# Patient Record
Sex: FEMALE | Race: WHITE | NOT HISPANIC OR LATINO | Employment: FULL TIME | URBAN - METROPOLITAN AREA
[De-identification: names, ages, dates, MRNs, and addresses within clinical notes are randomized per-mention and may not be internally consistent; named-entity substitution may affect disease eponyms.]

---

## 2018-09-20 ENCOUNTER — PATIENT OUTREACH (OUTPATIENT)
Dept: OTHER | Facility: MEDICAL CENTER | Age: 63
End: 2018-09-20

## 2018-09-20 NOTE — LETTER
[facility]   [address]  [place] [address]  Phone: [phone] - Fax: [phone]              Thao Wan  [address]  [facility] [address]     Date: 09/20/18  Medical Record Number: [id number]    Dear Thao,      I am a Cancer Nurse Navigator, a certified oncology nurse. My role is to assess any needs you may have with education, guidance and support. I am available to you and your family from diagnosis through your survivorship.       I am available to address your needs during your journey with the following services:     Care Coordination  I can assist you in facilitating communication between your cancer care treatment team to ensure timely treatment and follow-up.  I can also assist with transition of care back to your primary care provider, or other specialist, as needed.  My goal is to bridge gaps for you throughout the course of your active treatment.       Education Services  Understanding the recommended treatment course by your physician is key. I can provide educational resources personalized to your cancer diagnosis to help you understand your diagnosis and treatment. Please let me know if you would like to receive information about your diagnosis and treatment plan.  I am here to help.     Support Services/Resource Information  [facility] we offer a full scope of support services.  I can assist you with referral information to:  · Cancer Clinical Trials & Research  · Nutrition counseling  · Support groups  · Complementary Therapies such as Healing Touch and Mind-Body Techniques Meditation  · Patient Financial Advocates  ·   · [facility], an [employer] affiliate office, our volunteers can assist you with accessing our [email or web address] library, support services information, head coverings and comfort items  · Community and national resources, included eligibility based jourdan assistance and pharmaceutical access  programs, if you are in need of additional information.     [facility] offers services that include:  · Behavioral Health  · Genetic counseling & testing  · Acupuncture  · Lymphedema prevention/treatment program  · Palliative care services.       Our care team includes a Survivorship Nurse Navigator, who meets with you after your treatment is completed to review your survivorship care plan and treatment summary.      I hope you have an excellent patient experience.  Please feel free to share with me your comments regarding the care you have received- we value your feedback.    Sincerely,      Pilar Capone R.N.  Cancer Nurse Navigator    Office: [phone] / [phone]   Email:  [email or web address]

## 2018-09-20 NOTE — PROGRESS NOTES
Pt returns call.  She states that she has been receiving chemotherapy in [place], but she will be transferring her care here to [place] to complete her Herceptin and to move forward with radiation.  Discussed ONN role.  Pt denies any barriers at this time.  Encouraged pt to call should anything arise.

## 2018-10-01 ENCOUNTER — HOSPITAL ENCOUNTER (OUTPATIENT)
Dept: RADIATION ONCOLOGY | Facility: MEDICAL CENTER | Age: 63
End: 2018-10-31
Attending: RADIOLOGY
Payer: COMMERCIAL

## 2018-10-01 VITALS
SYSTOLIC BLOOD PRESSURE: 113 MMHG | DIASTOLIC BLOOD PRESSURE: 67 MMHG | WEIGHT: 167.2 LBS | HEART RATE: 87 BPM | TEMPERATURE: 98.2 F

## 2018-10-01 DIAGNOSIS — C50.812 MALIGNANT NEOPLASM OF OVERLAPPING SITES OF LEFT BREAST IN FEMALE, ESTROGEN RECEPTOR POSITIVE (HCC): ICD-10-CM

## 2018-10-01 DIAGNOSIS — Z17.0 MALIGNANT NEOPLASM OF OVERLAPPING SITES OF LEFT BREAST IN FEMALE, ESTROGEN RECEPTOR POSITIVE (HCC): ICD-10-CM

## 2018-10-01 PROCEDURE — 99214 OFFICE O/P EST MOD 30 MIN: CPT | Performed by: RADIOLOGY

## 2018-10-01 PROCEDURE — 99205 OFFICE O/P NEW HI 60 MIN: CPT | Performed by: RADIOLOGY

## 2018-10-01 RX ORDER — ONDANSETRON 4 MG/1
4 TABLET, ORALLY DISINTEGRATING ORAL EVERY 6 HOURS PRN
COMMUNITY

## 2018-10-01 RX ORDER — VITS A,C,E/LUTEIN/MINERALS 300MCG-200
1 TABLET ORAL DAILY
COMMUNITY

## 2018-10-01 RX ORDER — PROCHLORPERAZINE MALEATE 10 MG
10 TABLET ORAL EVERY 6 HOURS PRN
COMMUNITY

## 2018-10-01 RX ORDER — PANTOPRAZOLE SODIUM 40 MG/1
40 TABLET, DELAYED RELEASE ORAL DAILY
COMMUNITY

## 2018-10-01 RX ORDER — DEXAMETHASONE 4 MG/1
4 TABLET ORAL 2 TIMES DAILY
COMMUNITY
End: 2019-04-17

## 2018-10-01 RX ORDER — CHLORAL HYDRATE 500 MG
1000 CAPSULE ORAL
COMMUNITY

## 2018-10-01 ASSESSMENT — PAIN SCALES - GENERAL: PAINLEVEL: 7=MODERATE-SEVERE PAIN

## 2018-10-01 NOTE — CONSULTS
RADIATION ONCOLOGY CONSULT    DATE OF SERVICE: 10/1/2018    IDENTIFICATION: A 63 y.o. 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  She is here at the kind request of Dr. Jacobson in [place].      HISTORY OF PRESENT ILLNESS: Patient's history dates back to 2014 when she was diagnosed with a ductal carcinoma in situ. She underwent a mastectomy and reconstruction in 2014 no further therapy was delivered at that time. More recently she noted in March of this year of mass in the upper outer quadrant of the reconstructed breast. On 4/2/2018 she underwent a left breast tissue segmental resection. She was found to have a 2.5 cm tumor it was 1 mm from the lateral margin 6 mm from the superior and medial margins and greater than 1 cm from other margins. There was high-grade DCIS present at the superior margin and there was 1 out of 1 nodes involved with tumor and it was a 1 cm tumor focus. Additional lateral margin showed no residual tumor present. Tumor was found to be grade 3 there was no extracapsular extension. Estrogen receptor was 38% progesterone perceptively 1.1% and HER-2/maricarmen 3+. Because of the close margin C underwent a reexcision on 4/23/2018 and the superior margin in that resection specimen showed no evidence of residual tumor. Tumor was sent for man present and she was extremely high risk of recurrence with no treatment and she is currently undergoing [facility] chemotherapy. She will be receiving her last dose tomorrow. She seen in consultation today about radiation therapy for local control       PAST MEDICAL HISTORY:   Past Medical History:   Diagnosis Date   • Cancer (HCC)     breast cancer left   • GERD (gastroesophageal reflux disease)        PAST SURGICAL HISTORY:  Past Surgical History:   Procedure Laterality Date    • MASTECTOMY      left    • OTHER      lumpectomy breast tissue (left) 18 and reexcision due to positive margin 18   • OTHER         • OTHER      implanted port -right       GYNECOLOGICAL STATUS:  : 1, Para: 1 and Number of Interrupted Pregnancies: 0    HORMONE USE:  Contraceptive hormone use 10 years and Post-menopause use 0 years    CURRENT MEDICATIONS:  Current Outpatient Prescriptions   Medication Sig Dispense Refill   • Multiple Vitamins-Minerals (OCUVITE-LUTEIN) Tab Take 1 tablet by mouth every day.     • multivitamin (THERAGRAN) Tab Take 1 Tab by mouth every day.     • vitamin D (CHOLECALCIFEROL) 1000 UNIT Tab Take 1,000 Units by mouth every day.     • Omega-3 Fatty Acids (FISH OIL) 1000 MG Cap capsule Take 1,000 mg by mouth 3 times a day, with meals.     • ondansetron (ZOFRAN ODT) 4 MG TABLET DISPERSIBLE Take 4 mg by mouth every 6 hours as needed for Nausea.     • Lidocaine-Prilocaine (EMLA EX) by Apply externally route.     • prochlorperazine (COMPAZINE) 10 MG Tab Take 10 mg by mouth every 6 hours as needed.     • pantoprazole (PROTONIX) 40 MG Tablet Delayed Response Take 40 mg by mouth every day.     • dexamethasone (DECADRON) 4 MG Tab Take 4 mg by mouth 2 times a day.     • Naproxen Sodium (ALEVE PO) Take  by mouth.       No current facility-administered medications for this encounter.        ALLERGIES:    Patient has no known allergies.    FAMILY HISTORY:    Family History   Problem Relation Age of Onset   • Allergies Mother    • Arrythmia Mother    • Hyperlipidemia Mother    • Stroke Mother    • Cancer Mother         breast cancer   • Diabetes Brother    • Prostate cancer Brother    [unfilled]        SOCIAL HISTORY:     reports that she has quit smoking. She has never used smokeless tobacco. She reports that she does not drink alcohol or use drugs.  Patient is , lives in [place], [place] and has 1 son. 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     REVIEW OF SYSTEMS: Pertinent positives consist of swelling in the lower extremities since chemo 10 pound weight gain since chemo, fatigue dry mouth loss of taste nosebleeds all since chemo. She has trouble with night vision. Since chemo she's had constipation diarrhea heartburn nausea. She has a history of hemorrhoids and nocturia and incontinence. She has muscle pain joint pain and joint swelling from chemo. She's had some memory loss and intermittent colored sputum.  The rest of the review of systems is negative and has been reviewed by me. It is in the nursing note dated 10/1/2018 in [facility]    PHYSICAL EXAM:    0= Fully active, able to carry on all pre-disease performance without restriction.  Vitals:    10/01/18 1307   BP: 113/67   BP Location: Right arm   Patient Position: Sitting   Pulse: 87   Temp: 36.8 °C (98.2 °F)   Weight: 75.8 kg (167 lb 3.2 oz)          Pain Scale: 0-10  Pain Assessement: 7/10  Pain Location, Orientation and Scale: bilateral LE muscle pain  What makes the pain better: aleve  What makes the pain worse: taxol/neulasta    GENERAL: Well-appearing alert and oriented ×3 in no apparent distress  Breasts: Right breast is normal no palpable masses only mild fibroglandular changes port is placed in the infraclavicular fossa. The left reconstructed breast is smaller than the right and there is evidence of a new scar in the upper outer quadrant this area is also smooth flat there is no evidence of nodularity or mass and there are no masses in either axilla.  HEENT:  Pupils are equal, round, and reactive to light.  Extraocular muscles   are intact. Sclerae nonicteric.  Conjunctivae pink.  Oral cavity, tongue   protrudes midline.   NECK:  Supple without evidence of thyromegaly.  NODES:  No peripheral adenopathy of the neck, supraclavicular fossa or axillae   bilaterally.  LUNGS:  Clear to ascultation   HEART:  Regular rate and rhythm.  No murmur appreciated  ABDOMEN:   Soft. No evidence of hepatosplenomegaly.  Positive bowel sounds.  EXTREMITIES:  Without Edema.  NEUROLOGIC:  Cranial nerves II through XII were intact. Normal stance and gait motor and sensory grossly within normal limits              IMPRESSION:    A 63 y.o. 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 Currently completing [facility] chemotherapy.      RECOMMENDATIONS:   I discussed with the patient and her  I would recommend radiation therapy to decrease the chance of local regional recurrence. I think she is at high risk because she had one no taken out and there could be additional nodes that are microscopically positive.  I've described the details of radiation along with the side effects both acute and chronic, including but not exclusive to fatigue, skin reaction, local soreness, swelling, delayed healing, scarring fibrosis discoloration. They also understand that this could affect the implant causing scarring and retraction. Ample time was allowed for questions, and patient understands.    We have tentatively scheduled for simulation in a couple weeks to get started soon thereafter. We have also made a referral to medical oncology as she would like to get the rest of her treatment here and renal as it is closer.    Thank you for the opportunity to participate in her care.  If any questions or comments, please do not hesitate in calling.    Please note that this dictation was created using voice recognition software. I have made every reasonable attempt to correct obvious errors, but I expect that there are errors of grammar and possibly content that I did not discover before finalizing the note.

## 2018-10-16 ENCOUNTER — OFFICE VISIT (OUTPATIENT)
Dept: HEMATOLOGY ONCOLOGY | Facility: MEDICAL CENTER | Age: 63
End: 2018-10-16
Payer: COMMERCIAL

## 2018-10-16 ENCOUNTER — HOSPITAL ENCOUNTER (OUTPATIENT)
Dept: RADIATION ONCOLOGY | Facility: MEDICAL CENTER | Age: 63
End: 2018-10-16

## 2018-10-16 VITALS
HEART RATE: 83 BPM | TEMPERATURE: 97.9 F | WEIGHT: 163.8 LBS | BODY MASS INDEX: 27.96 KG/M2 | HEIGHT: 64 IN | RESPIRATION RATE: 18 BRPM | DIASTOLIC BLOOD PRESSURE: 63 MMHG | OXYGEN SATURATION: 98 % | SYSTOLIC BLOOD PRESSURE: 133 MMHG

## 2018-10-16 DIAGNOSIS — C50.912 MALIGNANT NEOPLASM OF LEFT BREAST IN FEMALE, ESTROGEN RECEPTOR POSITIVE, UNSPECIFIED SITE OF BREAST (HCC): ICD-10-CM

## 2018-10-16 DIAGNOSIS — Z17.0 MALIGNANT NEOPLASM OF LEFT BREAST IN FEMALE, ESTROGEN RECEPTOR POSITIVE, UNSPECIFIED SITE OF BREAST (HCC): ICD-10-CM

## 2018-10-16 PROBLEM — C50.919 MALIGNANT NEOPLASM OF FEMALE BREAST (HCC): Status: ACTIVE | Noted: 2018-10-16

## 2018-10-16 PROCEDURE — 77290 THER RAD SIMULAJ FIELD CPLX: CPT | Mod: 26 | Performed by: RADIOLOGY

## 2018-10-16 PROCEDURE — 77290 THER RAD SIMULAJ FIELD CPLX: CPT | Performed by: RADIOLOGY

## 2018-10-16 PROCEDURE — 77334 RADIATION TREATMENT AID(S): CPT | Performed by: RADIOLOGY

## 2018-10-16 PROCEDURE — 99205 OFFICE O/P NEW HI 60 MIN: CPT | Performed by: INTERNAL MEDICINE

## 2018-10-16 PROCEDURE — 77470 SPECIAL RADIATION TREATMENT: CPT | Mod: 26 | Performed by: RADIOLOGY

## 2018-10-16 PROCEDURE — 77470 SPECIAL RADIATION TREATMENT: CPT | Performed by: RADIOLOGY

## 2018-10-16 PROCEDURE — 77263 THER RADIOLOGY TX PLNG CPLX: CPT | Performed by: RADIOLOGY

## 2018-10-16 PROCEDURE — 77334 RADIATION TREATMENT AID(S): CPT | Mod: 26 | Performed by: RADIOLOGY

## 2018-10-16 RX ORDER — PROCHLORPERAZINE MALEATE 10 MG
10 TABLET ORAL EVERY 6 HOURS PRN
Status: CANCELLED | OUTPATIENT
Start: 2018-10-30

## 2018-10-16 RX ORDER — ACETAMINOPHEN 325 MG/1
650 TABLET ORAL EVERY 4 HOURS PRN
COMMUNITY

## 2018-10-16 RX ORDER — 0.9 % SODIUM CHLORIDE 0.9 %
VIAL (ML) INJECTION PRN
Status: CANCELLED | OUTPATIENT
Start: 2018-10-30

## 2018-10-16 RX ORDER — 0.9 % SODIUM CHLORIDE 0.9 %
5 VIAL (ML) INJECTION PRN
Status: CANCELLED | OUTPATIENT
Start: 2018-10-30

## 2018-10-16 RX ORDER — ONDANSETRON 2 MG/ML
4 INJECTION INTRAMUSCULAR; INTRAVENOUS
Status: CANCELLED | OUTPATIENT
Start: 2018-10-30

## 2018-10-16 RX ORDER — ONDANSETRON 8 MG/1
8 TABLET, ORALLY DISINTEGRATING ORAL
Status: CANCELLED | OUTPATIENT
Start: 2018-10-30

## 2018-10-16 RX ORDER — SODIUM CHLORIDE 9 MG/ML
INJECTION, SOLUTION INTRAVENOUS CONTINUOUS
Status: CANCELLED | OUTPATIENT
Start: 2018-10-30

## 2018-10-16 ASSESSMENT — PAIN SCALES - GENERAL: PAINLEVEL: 2=MINIMAL-SLIGHT

## 2018-10-16 NOTE — PROGRESS NOTES
Consult Note: Oncology     Date of consultation: 10/16/2018 10:04 AM     Referring provider: Anastasia Tee M.D.     Reason for consultation:  T2N1a ER 38% VT 1.1% and HER-2/maricarmen 3+ upper outer quadrant of left reconstructed breast, prior history of DCIS         History of presenting illness:     Patient is a 64 yo F with a history of DCIS with L mastectomy and reconstruction in 2014 who   - March 2018, abnormal mammogram   - 4/2/2018 she underwent a left breast tissue segmental resection.  -2.5 cm tumor , 1 mm from the lateral margin 6 mm from the superior and medial margins and greater than 1 cm from other margins. There was high-grade DCIS present at the superior margin and there was 1 out of 1 nodes involved with tumor and it was a 1 cm tumor focus. Additional lateral margin showed no residual tumor present. Tumor was found to be grade 3 there was no extracapsular extension. Estrogen receptor was 38% progesterone perceptively 1.1% and HER-2/maricarmen 3+. Because of the close margin C underwent a reexcision on 4/23/2018 and the superior margin in that resection specimen showed no evidence of residual tumor T2N1a ER 38% VT 1.1% and HER-2/maricarmen 3+.  - TCH therapy (docetaxel, carboplatin, trastuzumab) in [place], [place] and completed her 6th cycle on 10/02/18. She will be continuing with Herceptin (trastuzumab) treatment next week here in [place]. . She saw Dr. Tee with plans to start radiation soon.    Patient reported a history of mild bone loss detected by a bone scan about two years ago. She has been in menopause for seven years. Family history is significant for mother diagnosed with breast cancer. Genetic testing has not been performed on patient.     Currently, patient reports tolerating chemotherapy well. She does have some residual bilateral upper leg pain that has been improving since the cessation of her TCH therapy. Fatigue is tolerated well by patient. She does not report any other concerning symptoms.          Past Medical History:  Past Medical History        Past Medical History:   Diagnosis Date   • Cancer (HCC)       breast cancer left   • GERD (gastroesophageal reflux disease)              Past surgical history:  Past Surgical History         Past Surgical History:   Procedure Laterality Date   • MASTECTOMY         left    • OTHER         lumpectomy breast tissue (left) 18 and reexcision due to positive margin 18   • OTHER            • OTHER         implanted port -right            Allergies: Patient has no known allergies.     Medications:  Current Medications          Current Outpatient Prescriptions   Medication Sig Dispense Refill   • acetaminophen (TYLENOL) 325 MG Tab Take 650 mg by mouth every four hours as needed.       • Multiple Vitamins-Minerals (OCUVITE-LUTEIN) Tab Take 1 tablet by mouth every day.       • multivitamin (THERAGRAN) Tab Take 1 Tab by mouth every day.       • vitamin D (CHOLECALCIFEROL) 1000 UNIT Tab Take 1,000 Units by mouth every day.       • Omega-3 Fatty Acids (FISH OIL) 1000 MG Cap capsule Take 1,000 mg by mouth 3 times a day, with meals.       • ondansetron (ZOFRAN ODT) 4 MG TABLET DISPERSIBLE Take 4 mg by mouth every 6 hours as needed for Nausea.       • pantoprazole (PROTONIX) 40 MG Tablet Delayed Response Take 40 mg by mouth every day.       • Naproxen Sodium (ALEVE PO) Take by mouth.       • Lidocaine-Prilocaine (EMLA EX) by Apply externally route.       • prochlorperazine (COMPAZINE) 10 MG Tab Take 10 mg by mouth every 6 hours as needed.       • dexamethasone (DECADRON) 4 MG Tab Take 4 mg by mouth 2 times a day.          No current facility-administered medications for this visit.            Social History:  Social History   Social History            Social History   • Marital status:        Spouse name: N/A   • Number of children: N/A   • Years of education: N/A          Occupational History   • Not on file.             Social History Main Topics   •  "Smoking status: Former Smoker   • Smokeless tobacco: Never Used         Comment: quit 18 years ago   • Alcohol use No   • Drug use: No   • Sexual activity: Not on file           Other Topics Concern   • Not on file          Social History Narrative   • No narrative on file            Family History:  Family History         Family History   Problem Relation Age of Onset   • Allergies Mother     • Arrythmia Mother     • Hyperlipidemia Mother     • Stroke Mother     • Cancer Mother       breast cancer   • Diabetes Brother     • Prostate cancer Brother              Review of Systems:  All other review of systems are negative except what was mentioned above in the HPI.     Constitutional: No fever, chills, weight loss , +fatigue (see HPI).  Respiratory:No new cough, sputum production, shortness of breath and wheezing.   Cardiovascular: No new chest pain, palpitations.   Gastrointestinal: No nausea, vomiting, abdominal pain.  Musculoskeletal: +Bilateral leg pain (see HPI).  Neurological: No numbness or tingling.      Physical Exam:  Vitals: /63 (BP Location: Right arm, Patient Position: Sitting, BP Cuff Size: Large adult)  Pulse 83  Temp 36.6 °C (97.9 °F) (Temporal)  Resp 18  Ht 1.626 m (5' 4\")  Wt 74.3 kg (163 lb 12.8 oz)  SpO2 98%  BMI 28.12 kg/m²     General: Not in acute distress, alert and oriented x 3  Lymph nodes: No palpable cervical, supraclavicular, or axillary lymphadenopathy.  Breast: L reconstructed breast noted, no tenderness, no skin dimpling.. She has a well-healed scar in the left reconstructed breast tissue. No other concerning mass or nodularity  CVS: Regular rate and rhythm, no murmurs noted  RESP: Clear to auscultate bilaterally, no wheezing or crackles.  ABD: Soft, non-tender, non-distended.  Extremity - no significant edema.  CNS-no focal neurological symptoms     Labs:  No results for input(s): RBC, HEMOGLOBIN, HEMATOCRIT, PLATELETCT, PROTHROMBTM, APTT, INR, IRON, FERRITIN, TOTIRONBC " in the last 72 hours.  No results found for: SODIUM, POTASSIUM, CHLORIDE, CO2, GLUCOSE, BUN, CREATININE, BUNCREATRAT, GLOMRATE     Assessment and Plan:    T2N1a ER 38% RI 1.1% and HER-2/maricarmen 3+ upper outer quadrant of left reconstructed breast . previous history of DCIS with L mastectomy and reconstruction in 2014. She had recurrence in the reconstructed breast tissue and is status post segmentectomy/Nelia section with negative margins.  She finished [facility] chemotherapy and we will start her on Herceptin maintenance.  . She has not received any projectile according to my information.  -She had estrogen positive, tomorrow, and we discussed the option of adjuvant endocrine therapy employing an aromatase inhibitor. She is postmenopausal. Peripheral discussed the partial adverse effects. We will see her back in 6 weeks to have further discussion and will start Arimidex after she finished radiation.-  -ECHO recommended (last one performed in June/July 2018) to monitor heart function post-chemotherapy.   -Recommended genetic testing for breast cancer mutations due to patient's previous hx of DCIS and positive family hx.   Complex patient requiring complex decision making. I have extensively reviewed her prior medical records as part of establishing care with me and formulated the plan.     She agreed and verbalized her agreement and understanding with the current plan. I answered all questions and concerns she has at this time.  Thank you for allowing me to participate in her care.     Please note that this dictation was created using voice recognition software. I have made every reasonable attempt to correct obvious errors, but I expect that there are errors of grammar and possibly content that I did not discover before finalizing the note.        SIGNATURES:  Cam Harper

## 2018-10-19 ENCOUNTER — TELEPHONE (OUTPATIENT)
Dept: HEMATOLOGY ONCOLOGY | Facility: MEDICAL CENTER | Age: 63
End: 2018-10-19

## 2018-10-19 DIAGNOSIS — Z17.0 MALIGNANT NEOPLASM OF LEFT BREAST IN FEMALE, ESTROGEN RECEPTOR POSITIVE, UNSPECIFIED SITE OF BREAST (HCC): ICD-10-CM

## 2018-10-19 DIAGNOSIS — C50.912 MALIGNANT NEOPLASM OF LEFT BREAST IN FEMALE, ESTROGEN RECEPTOR POSITIVE, UNSPECIFIED SITE OF BREAST (HCC): ICD-10-CM

## 2018-10-19 NOTE — TELEPHONE ENCOUNTER
Called Dr. Purcell's office to obtain patients oncologist notes, their office is close on Friday's will call back Monday 10/22/2018 to obtain records.

## 2018-10-22 PROCEDURE — 77334 RADIATION TREATMENT AID(S): CPT | Performed by: RADIOLOGY

## 2018-10-22 PROCEDURE — 77300 RADIATION THERAPY DOSE PLAN: CPT | Performed by: RADIOLOGY

## 2018-10-22 PROCEDURE — 77300 RADIATION THERAPY DOSE PLAN: CPT | Mod: 26 | Performed by: RADIOLOGY

## 2018-10-22 PROCEDURE — 77295 3-D RADIOTHERAPY PLAN: CPT | Performed by: RADIOLOGY

## 2018-10-22 PROCEDURE — 77334 RADIATION TREATMENT AID(S): CPT | Mod: 26 | Performed by: RADIOLOGY

## 2018-10-22 PROCEDURE — 77295 3-D RADIOTHERAPY PLAN: CPT | Mod: 26 | Performed by: RADIOLOGY

## 2018-10-23 PROCEDURE — 77280 THER RAD SIMULAJ FIELD SMPL: CPT | Mod: 26 | Performed by: RADIOLOGY

## 2018-10-23 PROCEDURE — 77280 THER RAD SIMULAJ FIELD SMPL: CPT | Performed by: RADIOLOGY

## 2018-10-24 ENCOUNTER — TELEPHONE (OUTPATIENT)
Dept: HEMATOLOGY ONCOLOGY | Facility: MEDICAL CENTER | Age: 63
End: 2018-10-24

## 2018-10-24 PROCEDURE — 77412 RADIATION TX DELIVERY LVL 3: CPT | Performed by: RADIOLOGY

## 2018-10-24 NOTE — TELEPHONE ENCOUNTER
Called and spoke with  at Dr. Purcell's office and was stated all records which they have were sent over to our office. There is no further notes from Dr. Purcell.

## 2018-10-24 NOTE — TELEPHONE ENCOUNTER
Called imaging and spoke with aguila to schedule patients echocardiogram and bone density, she stated their already scheduled for echo 10/29/2018 and dexa scan on 4/15/2019.

## 2018-10-25 ENCOUNTER — HOSPITAL ENCOUNTER (OUTPATIENT)
Dept: RADIATION ONCOLOGY | Facility: MEDICAL CENTER | Age: 63
End: 2018-10-25

## 2018-10-25 PROCEDURE — 77412 RADIATION TX DELIVERY LVL 3: CPT | Performed by: RADIOLOGY

## 2018-10-25 PROCEDURE — 77417 THER RADIOLOGY PORT IMAGE(S): CPT | Performed by: RADIOLOGY

## 2018-10-29 ENCOUNTER — HOSPITAL ENCOUNTER (OUTPATIENT)
Dept: RADIATION ONCOLOGY | Facility: MEDICAL CENTER | Age: 63
End: 2018-10-29

## 2018-10-29 ENCOUNTER — HOSPITAL ENCOUNTER (OUTPATIENT)
Dept: CARDIOLOGY | Facility: MEDICAL CENTER | Age: 63
End: 2018-10-29
Attending: INTERNAL MEDICINE
Payer: COMMERCIAL

## 2018-10-29 DIAGNOSIS — Z17.0 MALIGNANT NEOPLASM OF LEFT BREAST IN FEMALE, ESTROGEN RECEPTOR POSITIVE, UNSPECIFIED SITE OF BREAST (HCC): ICD-10-CM

## 2018-10-29 DIAGNOSIS — C50.912 MALIGNANT NEOPLASM OF LEFT BREAST IN FEMALE, ESTROGEN RECEPTOR POSITIVE, UNSPECIFIED SITE OF BREAST (HCC): ICD-10-CM

## 2018-10-29 LAB
LV EJECT FRACT  99904: 60
LV EJECT FRACT MOD 2C 99903: 59.66
LV EJECT FRACT MOD 4C 99902: 60.93
LV EJECT FRACT MOD BP 99901: 61.24

## 2018-10-29 PROCEDURE — 93308 TTE F-UP OR LMTD: CPT | Mod: 26 | Performed by: INTERNAL MEDICINE

## 2018-10-29 PROCEDURE — 93306 TTE W/DOPPLER COMPLETE: CPT

## 2018-10-29 PROCEDURE — 77336 RADIATION PHYSICS CONSULT: CPT | Performed by: RADIOLOGY

## 2018-10-29 PROCEDURE — 77412 RADIATION TX DELIVERY LVL 3: CPT | Performed by: RADIOLOGY

## 2018-10-29 NOTE — PROGRESS NOTES
"Patient Name: Thao Wan     Protocol: TCH       *Dosing Reference*  Trastuzumab 8 mg/kg IV over 90 days on Day 1 of Cycle 1 (completed in [place], [place]) followed by  Trastuzumab 6 mg/kg IV over 30 minutes on Day 1 beginning with Cycle 2  21-day cycle until DP or UT  NCCN Guidelines for Breast Cancer V.1.2018  Barbie JUARES et al - Onkologie. 2010;33(8-9):425-30    Dx: Breast cancer HER2 positive - recurrent        Allergies:  Patient has no known allergies.       BP (!) 89/52   Pulse 85   Temp 36.4 °C (97.5 °F)   Resp 18   Ht 1.635 m (5' 4.37\")   Wt 74.6 kg (164 lb 7.4 oz)   SpO2 98%   BMI 27.91 kg/m²  Body surface area is 1.84 meters squared.    Labs 10/30/18  ANC~ 1830 Plt = 201k   Hgb = 11.6     SCr = 0.67 mg/dL CrCl ~ 96.5 mL/min (minimum SCr of 0.7)   LFT's = WNLs  TBili = 0.3     Labs 10/29/18   ECHO LVEF 60%     Drug Order   (Drug name, dose, route, IV Fluid & volume, frequency, number of doses) Cycle: C1 - Herceptin maintenance      Previous treatment: s/p 6 cycles of TCH in [place], [place], last tx 10/2/18     Medication = Trastuzumab  Base Dose= 6 mg/kg  Calc Dose: Base Dose x 74.6 kg = 447.6 mg  Final Dose = 450 mg  Route = IV  Fluid & Volume =  mL  Admin Duration = Over 30 minutes          <5% difference, okay to treat with final dose     By my signature below, I confirm this process was performed independently with the BSA and all final chemotherapy dosing calculations congruent. I have reviewed the above chemotherapy order and that my calculation of the final dose and BSA (when applicable) corroborate those calculations of the  pharmacist. Discrepancies of 5% or greater in the written dose have been addressed and documented within the [facility] Progress notes.    Aakash Haque, PharmD    "

## 2018-10-30 ENCOUNTER — HOSPITAL ENCOUNTER (OUTPATIENT)
Dept: RADIATION ONCOLOGY | Facility: MEDICAL CENTER | Age: 63
End: 2018-10-30

## 2018-10-30 ENCOUNTER — HOSPITAL ENCOUNTER (OUTPATIENT)
Facility: MEDICAL CENTER | Age: 63
End: 2018-10-30
Attending: NURSE PRACTITIONER
Payer: COMMERCIAL

## 2018-10-30 ENCOUNTER — NON-PROVIDER VISIT (OUTPATIENT)
Dept: HEMATOLOGY ONCOLOGY | Facility: MEDICAL CENTER | Age: 63
End: 2018-10-30
Payer: COMMERCIAL

## 2018-10-30 ENCOUNTER — OUTPATIENT INFUSION SERVICES (OUTPATIENT)
Dept: ONCOLOGY | Facility: MEDICAL CENTER | Age: 63
End: 2018-10-30
Attending: INTERNAL MEDICINE
Payer: COMMERCIAL

## 2018-10-30 ENCOUNTER — OFFICE VISIT (OUTPATIENT)
Dept: HEMATOLOGY ONCOLOGY | Facility: MEDICAL CENTER | Age: 63
End: 2018-10-30
Payer: COMMERCIAL

## 2018-10-30 VITALS
OXYGEN SATURATION: 97 % | HEART RATE: 93 BPM | SYSTOLIC BLOOD PRESSURE: 93 MMHG | BODY MASS INDEX: 28.19 KG/M2 | DIASTOLIC BLOOD PRESSURE: 65 MMHG | WEIGHT: 165.12 LBS | HEIGHT: 64 IN | RESPIRATION RATE: 18 BRPM | TEMPERATURE: 97.6 F

## 2018-10-30 VITALS
WEIGHT: 165.12 LBS | OXYGEN SATURATION: 97 % | DIASTOLIC BLOOD PRESSURE: 65 MMHG | HEART RATE: 93 BPM | SYSTOLIC BLOOD PRESSURE: 93 MMHG | TEMPERATURE: 97.5 F | HEIGHT: 64 IN | BODY MASS INDEX: 28.19 KG/M2 | RESPIRATION RATE: 18 BRPM

## 2018-10-30 VITALS
RESPIRATION RATE: 18 BRPM | BODY MASS INDEX: 28.08 KG/M2 | OXYGEN SATURATION: 98 % | SYSTOLIC BLOOD PRESSURE: 89 MMHG | TEMPERATURE: 97.5 F | HEART RATE: 85 BPM | DIASTOLIC BLOOD PRESSURE: 52 MMHG | HEIGHT: 64 IN | WEIGHT: 164.46 LBS

## 2018-10-30 DIAGNOSIS — C50.912 MALIGNANT NEOPLASM OF LEFT BREAST IN FEMALE, ESTROGEN RECEPTOR POSITIVE, UNSPECIFIED SITE OF BREAST (HCC): ICD-10-CM

## 2018-10-30 DIAGNOSIS — C50.011 MALIGNANT NEOPLASM OF NIPPLE OF RIGHT BREAST IN FEMALE, UNSPECIFIED ESTROGEN RECEPTOR STATUS (HCC): ICD-10-CM

## 2018-10-30 DIAGNOSIS — Z17.0 MALIGNANT NEOPLASM OF LEFT BREAST IN FEMALE, ESTROGEN RECEPTOR POSITIVE, UNSPECIFIED SITE OF BREAST (HCC): ICD-10-CM

## 2018-10-30 LAB
ALBUMIN SERPL BCP-MCNC: 3.8 G/DL (ref 3.2–4.9)
ALBUMIN/GLOB SERPL: 1.5 G/DL
ALP SERPL-CCNC: 63 U/L (ref 30–99)
ALT SERPL-CCNC: 22 U/L (ref 2–50)
ANION GAP SERPL CALC-SCNC: 8 MMOL/L (ref 0–11.9)
AST SERPL-CCNC: 20 U/L (ref 12–45)
BASOPHILS # BLD AUTO: 0.5 % (ref 0–1.8)
BASOPHILS # BLD: 0.02 K/UL (ref 0–0.12)
BILIRUB SERPL-MCNC: 0.3 MG/DL (ref 0.1–1.5)
BUN SERPL-MCNC: 10 MG/DL (ref 8–22)
CALCIUM SERPL-MCNC: 9.3 MG/DL (ref 8.5–10.5)
CHLORIDE SERPL-SCNC: 107 MMOL/L (ref 96–112)
CO2 SERPL-SCNC: 24 MMOL/L (ref 20–33)
CREAT SERPL-MCNC: 0.67 MG/DL (ref 0.5–1.4)
EOSINOPHIL # BLD AUTO: 0.13 K/UL (ref 0–0.51)
EOSINOPHIL NFR BLD: 3.2 % (ref 0–6.9)
ERYTHROCYTE [DISTWIDTH] IN BLOOD BY AUTOMATED COUNT: 61.1 FL (ref 35.9–50)
GLOBULIN SER CALC-MCNC: 2.6 G/DL (ref 1.9–3.5)
GLUCOSE SERPL-MCNC: 107 MG/DL (ref 65–99)
HCT VFR BLD AUTO: 36.3 % (ref 37–47)
HGB BLD-MCNC: 11.6 G/DL (ref 12–16)
IMM GRANULOCYTES # BLD AUTO: 0.01 K/UL (ref 0–0.11)
IMM GRANULOCYTES NFR BLD AUTO: 0.2 % (ref 0–0.9)
LYMPHOCYTES # BLD AUTO: 1.66 K/UL (ref 1–4.8)
LYMPHOCYTES NFR BLD: 41.4 % (ref 22–41)
MCH RBC QN AUTO: 33.6 PG (ref 27–33)
MCHC RBC AUTO-ENTMCNC: 32 G/DL (ref 33.6–35)
MCV RBC AUTO: 105.2 FL (ref 81.4–97.8)
MONOCYTES # BLD AUTO: 0.36 K/UL (ref 0–0.85)
MONOCYTES NFR BLD AUTO: 9 % (ref 0–13.4)
NEUTROPHILS # BLD AUTO: 1.83 K/UL (ref 2–7.15)
NEUTROPHILS NFR BLD: 45.7 % (ref 44–72)
NRBC # BLD AUTO: 0 K/UL
NRBC BLD-RTO: 0 /100 WBC
PLATELET # BLD AUTO: 201 K/UL (ref 164–446)
PMV BLD AUTO: 8.9 FL (ref 9–12.9)
POTASSIUM SERPL-SCNC: 4 MMOL/L (ref 3.6–5.5)
PROT SERPL-MCNC: 6.4 G/DL (ref 6–8.2)
RBC # BLD AUTO: 3.45 M/UL (ref 4.2–5.4)
SODIUM SERPL-SCNC: 139 MMOL/L (ref 135–145)
WBC # BLD AUTO: 4 K/UL (ref 4.8–10.8)

## 2018-10-30 PROCEDURE — 77412 RADIATION TX DELIVERY LVL 3: CPT | Performed by: RADIOLOGY

## 2018-10-30 PROCEDURE — 700105 HCHG RX REV CODE 258: Performed by: INTERNAL MEDICINE

## 2018-10-30 PROCEDURE — A4212 NON CORING NEEDLE OR STYLET: HCPCS

## 2018-10-30 PROCEDURE — 700111 HCHG RX REV CODE 636 W/ 250 OVERRIDE (IP)

## 2018-10-30 PROCEDURE — 96413 CHEMO IV INFUSION 1 HR: CPT

## 2018-10-30 PROCEDURE — 85025 COMPLETE CBC W/AUTO DIFF WBC: CPT

## 2018-10-30 PROCEDURE — 80053 COMPREHEN METABOLIC PANEL: CPT

## 2018-10-30 PROCEDURE — 99213 OFFICE O/P EST LOW 20 MIN: CPT | Performed by: NURSE PRACTITIONER

## 2018-10-30 PROCEDURE — 36415 COLL VENOUS BLD VENIPUNCTURE: CPT | Performed by: INTERNAL MEDICINE

## 2018-10-30 PROCEDURE — 700111 HCHG RX REV CODE 636 W/ 250 OVERRIDE (IP): Performed by: INTERNAL MEDICINE

## 2018-10-30 RX ORDER — LIDOCAINE HYDROCHLORIDE 10 MG/ML
INJECTION, SOLUTION EPIDURAL; INFILTRATION; INTRACAUDAL; PERINEURAL
Status: COMPLETED
Start: 2018-10-30 | End: 2018-10-30

## 2018-10-30 RX ADMIN — LIDOCAINE HYDROCHLORIDE 2 ML: 10 INJECTION, SOLUTION EPIDURAL; INFILTRATION; INTRACAUDAL; PERINEURAL at 15:25

## 2018-10-30 RX ADMIN — HEPARIN 500 UNITS: 100 SYRINGE at 16:55

## 2018-10-30 RX ADMIN — TRASTUZUMAB 450 MG: 150 INJECTION, POWDER, LYOPHILIZED, FOR SOLUTION INTRAVENOUS at 16:11

## 2018-10-30 ASSESSMENT — ENCOUNTER SYMPTOMS
SHORTNESS OF BREATH: 0
DIARRHEA: 0
PALPITATIONS: 0
HEADACHES: 0
COUGH: 0
CHILLS: 0
WEIGHT LOSS: 0
FEVER: 0
CONSTIPATION: 0
NAUSEA: 0
DIZZINESS: 0
MYALGIAS: 1
ROS GI COMMENTS: LAST BM WAS 2 DAYS AGO
VOMITING: 0
WHEEZING: 0

## 2018-10-30 ASSESSMENT — PAIN SCALES - GENERAL
PAINLEVEL: 5=MODERATE PAIN
PAINLEVEL: 5=MODERATE PAIN
PAINLEVEL_OUTOF10: 5

## 2018-10-30 NOTE — PROGRESS NOTES
Chemotherapy Verification - SECONDARY RN       Height = 64.37 in  Weight = 74.6 kg  BSA = 1.84 m2       Medication: Herceptin  Dose: 6 mg/kg  Calculated Dose: 447.6 mg                             (In mg/m2, AUC, mg/kg)     I confirm that this process was performed independently.

## 2018-10-30 NOTE — PROGRESS NOTES
Chemotherapy Verification - PRIMARY RN      Height = 163.5 cm  Weight = 74.6 kg  BSA = 1.84 m2       Medication: trastuzumab  Dose: 6 mg/kg  Calculated Dose: 447.6 mg                             (In mg/m2, AUC, mg/kg)       I confirm this process was performed independently with the BSA and all final chemotherapy dosing calculations congruent.  Any discrepancies of 5% or greater have been addressed with the chemotherapy pharmacist. The resolution of the discrepancy has been documented in the EPIC progress notes.

## 2018-10-30 NOTE — PROGRESS NOTES
Subjective:      Thao Wan is a 63 y.o. female who presents for Follow-Up (prechemo ([facility])) for breast cancer.         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  She is accompanied by her  for today's visit.  Patient is scheduled to receive cycle #1 of single agent Herceptin starting today.  Patient recently establish care with Dr. Harper for her breast cancer.  She did complete TCH in [facility] and is due to start single agent Herceptin every 3 weeks.  She currently is under radiation therapy with Dr. Tee.  She has recently started treatment and is scheduled for 29 more treatments.  Patient is commuting every day from [place] for her radiation therapy.    Patient doing very well.  She does have some mild fatigue but not too bothersome.  She denies any fevers, chills, weight loss.  She denies any coughing, wheezing or shortness of breath.  She denies any chest pain, heart palpations or swelling in her legs.  She denies any nausea, vomiting, diarrhea or constipation.  Her last BM was approximately 2 days ago which is normal per patient's routine.  She is voiding without difficulty.  She does have some generalized myalgias more so in her legs from previous Neulasta.  She denies any dizziness or headaches.    She recently started radiation therapy with Dr. Tee and is due for 29 more treatments.  Her last treatment is scheduled for December 11.    Patient stated that she spoke with Dr. Harper regarding bone density.  She will not be due for another bone density scan until April 2019.      No Known Allergies  Current Outpatient Prescriptions on File Prior to Visit   Medication Sig Dispense Refill   • acetaminophen (TYLENOL) 325 MG Tab Take 650 mg by mouth every four hours as needed.     • Multiple Vitamins-Minerals (OCUVITE-LUTEIN) Tab Take 1 tablet by mouth every day.     •  "multivitamin (THERAGRAN) Tab Take 1 Tab by mouth every day.     • vitamin D (CHOLECALCIFEROL) 1000 UNIT Tab Take 1,000 Units by mouth every day.     • Omega-3 Fatty Acids (FISH OIL) 1000 MG Cap capsule Take 1,000 mg by mouth 3 times a day, with meals.     • ondansetron (ZOFRAN ODT) 4 MG TABLET DISPERSIBLE Take 4 mg by mouth every 6 hours as needed for Nausea.     • Lidocaine-Prilocaine (EMLA EX) by Apply externally route.     • prochlorperazine (COMPAZINE) 10 MG Tab Take 10 mg by mouth every 6 hours as needed.     • pantoprazole (PROTONIX) 40 MG Tablet Delayed Response Take 40 mg by mouth every day.     • dexamethasone (DECADRON) 4 MG Tab Take 4 mg by mouth 2 times a day.     • Naproxen Sodium (ALEVE PO) Take  by mouth.       No current facility-administered medications on file prior to visit.          Review of Systems   Constitutional: Positive for malaise/fatigue (very mild but not too bothersome). Negative for chills, fever and weight loss.   Respiratory: Negative for cough, shortness of breath and wheezing.    Cardiovascular: Negative for chest pain, palpitations and leg swelling.   Gastrointestinal: Negative for constipation, diarrhea, nausea and vomiting.        Last BM was 2 days ago   Genitourinary: Negative for dysuria.   Musculoskeletal: Positive for myalgias.        Aching legs, from previous Neulasta   Neurological: Negative for dizziness and headaches.          Objective:     BP (!) 93/65 (Patient Position: Sitting, BP Cuff Size: Adult)   Pulse 93   Temp 36.4 °C (97.5 °F) (Temporal)   Resp 18   Ht 1.626 m (5' 4.02\")   Wt 74.9 kg (165 lb 2 oz)   LMP  (LMP Unknown)   SpO2 97%   Breastfeeding? Unknown   BMI 28.33 kg/m²      Physical Exam   Constitutional: She is oriented to person, place, and time. She appears well-developed and well-nourished. No distress.   HENT:   Head: Normocephalic and atraumatic.   Mouth/Throat: Oropharynx is clear and moist. No oropharyngeal exudate.   Alopecia "   Cardiovascular: Normal rate, regular rhythm, normal heart sounds and intact distal pulses.  Exam reveals no gallop and no friction rub.    No murmur heard.  Pulmonary/Chest: Effort normal and breath sounds normal. No respiratory distress. She has no wheezes.   Abdominal: Soft. Bowel sounds are normal. She exhibits no distension. There is no tenderness.   Musculoskeletal: Normal range of motion. She exhibits no edema or tenderness.   Neurological: She is alert and oriented to person, place, and time.   Skin: Skin is warm and dry. No rash noted. She is not diaphoretic. No erythema. No pallor.   Psychiatric: She has a normal mood and affect. Her behavior is normal.   Vitals reviewed.      Hospital Outpatient Visit on 10/30/2018   Component Date Value Ref Range Status   • Sodium 10/30/2018 139  135 - 145 mmol/L Final   • Potassium 10/30/2018 4.0  3.6 - 5.5 mmol/L Final   • Chloride 10/30/2018 107  96 - 112 mmol/L Final   • Co2 10/30/2018 24  20 - 33 mmol/L Final   • Anion Gap 10/30/2018 8.0  0.0 - 11.9 Final   • Glucose 10/30/2018 107* 65 - 99 mg/dL Final   • Bun 10/30/2018 10  8 - 22 mg/dL Final   • Creatinine 10/30/2018 0.67  0.50 - 1.40 mg/dL Final   • Calcium 10/30/2018 9.3  8.5 - 10.5 mg/dL Final   • AST(SGOT) 10/30/2018 20  12 - 45 U/L Final   • ALT(SGPT) 10/30/2018 22  2 - 50 U/L Final   • Alkaline Phosphatase 10/30/2018 63  30 - 99 U/L Final   • Total Bilirubin 10/30/2018 0.3  0.1 - 1.5 mg/dL Final   • Albumin 10/30/2018 3.8  3.2 - 4.9 g/dL Final   • Total Protein 10/30/2018 6.4  6.0 - 8.2 g/dL Final   • Globulin 10/30/2018 2.6  1.9 - 3.5 g/dL Final   • A-G Ratio 10/30/2018 1.5  g/dL Final   • WBC 10/30/2018 4.0* 4.8 - 10.8 K/uL Final   • RBC 10/30/2018 3.45* 4.20 - 5.40 M/uL Final   • Hemoglobin 10/30/2018 11.6* 12.0 - 16.0 g/dL Final   • Hematocrit 10/30/2018 36.3* 37.0 - 47.0 % Final   • MCV 10/30/2018 105.2* 81.4 - 97.8 fL Final   • MCH 10/30/2018 33.6* 27.0 - 33.0 pg Final   • MCHC 10/30/2018 32.0* 33.6 -  35.0 g/dL Final   • RDW 10/30/2018 61.1* 35.9 - 50.0 fL Final   • Platelet Count 10/30/2018 201  164 - 446 K/uL Final   • MPV 10/30/2018 8.9* 9.0 - 12.9 fL Final   • Neutrophils-Polys 10/30/2018 45.70  44.00 - 72.00 % Final   • Lymphocytes 10/30/2018 41.40* 22.00 - 41.00 % Final   • Monocytes 10/30/2018 9.00  0.00 - 13.40 % Final   • Eosinophils 10/30/2018 3.20  0.00 - 6.90 % Final   • Basophils 10/30/2018 0.50  0.00 - 1.80 % Final   • Immature Granulocytes 10/30/2018 0.20  0.00 - 0.90 % Final   • Nucleated RBC 10/30/2018 0.00  /100 WBC Final   • Neutrophils (Absolute) 10/30/2018 1.83* 2.00 - 7.15 K/uL Final    Includes immature neutrophils, if present.   • Lymphs (Absolute) 10/30/2018 1.66  1.00 - 4.80 K/uL Final   • Monos (Absolute) 10/30/2018 0.36  0.00 - 0.85 K/uL Final   • Eos (Absolute) 10/30/2018 0.13  0.00 - 0.51 K/uL Final   • Baso (Absolute) 10/30/2018 0.02  0.00 - 0.12 K/uL Final   • Immature Granulocytes (abs) 10/30/2018 0.01  0.00 - 0.11 K/uL Final   • NRBC (Absolute) 10/30/2018 0.00  K/uL Final   • GFR If  10/30/2018 >60  >60 mL/min/1.73 m 2 Final   • GFR If Non  10/30/2018 >60  >60 mL/min/1.73 m 2 Final          Assessment/Plan:     1. Malignant neoplasm of left breast in female, estrogen receptor positive, unspecified site of breast (HCC)         Plan  1.  Patient with T2N1a ER 38% OK 1.1% and HER-2/maricarmen 3+ breast cancer, recently establish with Dr. Harper from [facility].  Patient is due to start cycle #1 of single agent Herceptin as she recently completed 6 cycles of TCH on October 2, 2018.  I personally reviewed her CBC and CMP and labs are appropriate to proceed with treatment as planned.  She is clinically stable as well.  Patient completed echocardiogram which shows ejection fraction of 60%.    2.  She currently undergoing radiation therapy with Dr. Tee.  She is due to complete radiation on December 11, which is approximately 6 weeks from now.    [facility] did discuss with the patient that she would to start aromatase inhibitor following completion of radiation therapy.    3.  Patient to follow-up in the clinic in 6 weeks or sooner if needed.  She will continue Herceptin treatment every 3 weeks as scheduled.  At the time of her next appointment she will be completing radiation therapy and discussion regards to aromatase inhibitor can be had at that time.

## 2018-10-30 NOTE — PROGRESS NOTES
"Pharmacy Chemotherapy calculation:    DX: recurrent breast cancer    Cycle 1- herceptin maintenance  Previous treatment = - TC therapy (docetaxel, carboplatin, trastuzumab) in [place], [place] and completed her 6th cycle on 10/02/18.    Regimen: maintenance trastuzumab(herceptin)  trastuzumab 8mg/kg IV loading dose over 90 min onC1D1- no loading dose as has been on herceptin  trastuzumab 6mg/kg IV over 30 min on day 1 beginning Cycle 2  q21 days until DP/UT  NCCN Guidelines for Breast cancer V.1.2018  Barbie JUARES et al - Onkologie. 2010;33(8-9):425-30. doi: 10.1159/301384363. Epub 2010 Jul 27.       BP (!) 89/52   Pulse 85   Temp 36.4 °C (97.5 °F)   Resp 18   Ht 1.635 m (5' 4.37\")   Wt 74.6 kg (164 lb 7.4 oz)   SpO2 98%   BMI 27.91 kg/m²   Body surface area is 1.84 meters squared.     No labs required  10/29/18- ECHO with  LVEF ~ 60%      trastuzumab 6mg/kg  X 74.6kg = 447.6mg    Rounded to vial size(within 10%) per dose rounding protocol.    ok to treat with final dose = 450mg IV      GEOVANNA Corral, Pharm.D.      "

## 2018-10-30 NOTE — NON-PROVIDER
Thao Wan is a 63 y.o. female here for a non-provider visit for: Lab Draws  on 10/30/2018 at 4:47 PM    Procedure Performed: Peripheral IV    Anatomical site: Right Antecubital Area (AC)    Equipment used: 21g Butterfly     Labs drawn: CBC w/diff and CMP    Ordering Provider: Dr. Cam Ambriz By: Juliana Perez, Med Ass't

## 2018-10-31 PROCEDURE — 77412 RADIATION TX DELIVERY LVL 3: CPT | Performed by: RADIOLOGY

## 2018-10-31 PROCEDURE — 77427 RADIATION TX MANAGEMENT X5: CPT | Performed by: RADIOLOGY

## 2018-10-31 NOTE — PROGRESS NOTES
Pt scheduled for trastuzumab; arrived ambulatory, independent; accompanied by . Pt endorsed moderate discomfort to legs secondary neupogen/neulasta; denied s/sx infection or other health changes. Lidocaine administered to R-[place]st port site per pt request. Port accessed using sterile technique; easily flushed, brisk blood return noted; pt tolerated well. No labs ordered; cardiac echo completed 10/29, LVEF 60%. Infusion completed w/o adverse events. Port flushed, brisk blood return noted; hep locked, de-accessed; pt tolerated well. Treatment plan reviewed; pt verbalized knowledge of next appt; denied any unmet needs. Pt discharged ambulatory, independent, NAD; accompanied by .

## 2018-11-01 ENCOUNTER — HOSPITAL ENCOUNTER (OUTPATIENT)
Dept: RADIATION ONCOLOGY | Facility: MEDICAL CENTER | Age: 63
End: 2018-11-01

## 2018-11-01 ENCOUNTER — HOSPITAL ENCOUNTER (OUTPATIENT)
Dept: RADIATION ONCOLOGY | Facility: MEDICAL CENTER | Age: 63
End: 2018-11-30
Attending: RADIOLOGY
Payer: COMMERCIAL

## 2018-11-01 PROCEDURE — 77412 RADIATION TX DELIVERY LVL 3: CPT | Performed by: RADIOLOGY

## 2018-11-01 PROCEDURE — 77417 THER RADIOLOGY PORT IMAGE(S): CPT | Performed by: RADIOLOGY

## 2018-11-02 ENCOUNTER — HOSPITAL ENCOUNTER (OUTPATIENT)
Dept: RADIATION ONCOLOGY | Facility: MEDICAL CENTER | Age: 63
End: 2018-11-02

## 2018-11-02 PROCEDURE — 77412 RADIATION TX DELIVERY LVL 3: CPT | Performed by: RADIOLOGY

## 2018-11-05 ENCOUNTER — HOSPITAL ENCOUNTER (OUTPATIENT)
Dept: RADIATION ONCOLOGY | Facility: MEDICAL CENTER | Age: 63
End: 2018-11-05

## 2018-11-05 PROCEDURE — 77336 RADIATION PHYSICS CONSULT: CPT | Performed by: RADIOLOGY

## 2018-11-05 PROCEDURE — 77412 RADIATION TX DELIVERY LVL 3: CPT | Performed by: RADIOLOGY

## 2018-11-06 ENCOUNTER — HOSPITAL ENCOUNTER (OUTPATIENT)
Dept: RADIATION ONCOLOGY | Facility: MEDICAL CENTER | Age: 63
End: 2018-11-06

## 2018-11-06 PROCEDURE — 77412 RADIATION TX DELIVERY LVL 3: CPT | Performed by: RADIOLOGY

## 2018-11-07 ENCOUNTER — HOSPITAL ENCOUNTER (OUTPATIENT)
Dept: RADIATION ONCOLOGY | Facility: MEDICAL CENTER | Age: 63
End: 2018-11-07

## 2018-11-07 PROCEDURE — 77427 RADIATION TX MANAGEMENT X5: CPT | Performed by: RADIOLOGY

## 2018-11-07 PROCEDURE — 77412 RADIATION TX DELIVERY LVL 3: CPT | Performed by: RADIOLOGY

## 2018-11-08 ENCOUNTER — HOSPITAL ENCOUNTER (OUTPATIENT)
Dept: RADIATION ONCOLOGY | Facility: MEDICAL CENTER | Age: 63
End: 2018-11-08

## 2018-11-08 PROCEDURE — 77412 RADIATION TX DELIVERY LVL 3: CPT | Performed by: RADIOLOGY

## 2018-11-08 PROCEDURE — 77417 THER RADIOLOGY PORT IMAGE(S): CPT | Performed by: RADIOLOGY

## 2018-11-09 ENCOUNTER — HOSPITAL ENCOUNTER (OUTPATIENT)
Dept: RADIATION ONCOLOGY | Facility: MEDICAL CENTER | Age: 63
End: 2018-11-09

## 2018-11-09 PROCEDURE — 77412 RADIATION TX DELIVERY LVL 3: CPT | Performed by: RADIOLOGY

## 2018-11-12 ENCOUNTER — HOSPITAL ENCOUNTER (OUTPATIENT)
Dept: RADIATION ONCOLOGY | Facility: MEDICAL CENTER | Age: 63
End: 2018-11-12

## 2018-11-12 PROCEDURE — 77336 RADIATION PHYSICS CONSULT: CPT | Performed by: RADIOLOGY

## 2018-11-12 PROCEDURE — 77412 RADIATION TX DELIVERY LVL 3: CPT | Performed by: RADIOLOGY

## 2018-11-13 ENCOUNTER — HOSPITAL ENCOUNTER (OUTPATIENT)
Dept: RADIATION ONCOLOGY | Facility: MEDICAL CENTER | Age: 63
End: 2018-11-13

## 2018-11-13 PROCEDURE — 77412 RADIATION TX DELIVERY LVL 3: CPT | Performed by: RADIOLOGY

## 2018-11-14 ENCOUNTER — HOSPITAL ENCOUNTER (OUTPATIENT)
Dept: RADIATION ONCOLOGY | Facility: MEDICAL CENTER | Age: 63
End: 2018-11-14

## 2018-11-14 PROCEDURE — 77427 RADIATION TX MANAGEMENT X5: CPT | Performed by: RADIOLOGY

## 2018-11-14 PROCEDURE — 77412 RADIATION TX DELIVERY LVL 3: CPT | Performed by: RADIOLOGY

## 2018-11-15 ENCOUNTER — HOSPITAL ENCOUNTER (OUTPATIENT)
Dept: RADIATION ONCOLOGY | Facility: MEDICAL CENTER | Age: 63
End: 2018-11-15

## 2018-11-15 PROCEDURE — 77417 THER RADIOLOGY PORT IMAGE(S): CPT | Performed by: RADIOLOGY

## 2018-11-15 PROCEDURE — 77412 RADIATION TX DELIVERY LVL 3: CPT | Performed by: RADIOLOGY

## 2018-11-16 ENCOUNTER — HOSPITAL ENCOUNTER (OUTPATIENT)
Dept: RADIATION ONCOLOGY | Facility: MEDICAL CENTER | Age: 63
End: 2018-11-16

## 2018-11-16 PROCEDURE — 77412 RADIATION TX DELIVERY LVL 3: CPT | Performed by: RADIOLOGY

## 2018-11-16 NOTE — PROGRESS NOTES
"Pharmacy Chemotherapy Verification  Patient Name: Thao Wan  DX: Recurrent breast cancer    Cycle 2 Herceptin maintenance  Previous treatment = Cycle 1 October 30, 2018; [facility] therapy (DOCEtaxel, CARBOplatin, trastuzumab) in [place], [place] and completed her 6th cycle on 10/02/18.    Regimen: Maintenance trastuzumab (Herceptin)  Trastuzumab 8 mg/kg IV loading dose over 90 min on C1D1  Trastuzumab 6 mg/kg IV over 30 min on day 1 beginning Cycle 2  q21 days until DP/UT  NCCN Guidelines for Breast cancer V.1.2018  Barbie JUARES, et al - Onkologie. 2010;33(8-9):425-30. doi: 10.1159/807251523. Epub 2010 Jul 27.    Allergies:Patient has no known allergies.  /60   Pulse 94   Temp 36.8 °C (98.2 °F) (Temporal)   Resp 18   Ht 1.635 m (5' 4.37\")   Wt 73.7 kg (162 lb 7.7 oz)   LMP  (LMP Unknown)   SpO2 97%   BMI 27.57 kg/m²   Body surface area is 1.83 meters squared.     No labs required    ECHO 10/29/18   LVEF ~ 60%    Trastuzumab 6 mg/kg  X 73.7 kg = 442.2 mg    Rounded to vial size (within 10%) per dose rounding protocol.    OK to treat with final dose = 450 mg IV    Chapis Blevins, PharmD, BCOP        "

## 2018-11-18 ENCOUNTER — HOSPITAL ENCOUNTER (OUTPATIENT)
Dept: RADIATION ONCOLOGY | Facility: MEDICAL CENTER | Age: 63
End: 2018-11-18

## 2018-11-18 PROCEDURE — 77336 RADIATION PHYSICS CONSULT: CPT | Performed by: RADIOLOGY

## 2018-11-18 PROCEDURE — 77412 RADIATION TX DELIVERY LVL 3: CPT | Performed by: RADIOLOGY

## 2018-11-19 ENCOUNTER — HOSPITAL ENCOUNTER (OUTPATIENT)
Dept: RADIATION ONCOLOGY | Facility: MEDICAL CENTER | Age: 63
End: 2018-11-19

## 2018-11-19 PROCEDURE — 77412 RADIATION TX DELIVERY LVL 3: CPT | Performed by: RADIOLOGY

## 2018-11-20 ENCOUNTER — HOSPITAL ENCOUNTER (OUTPATIENT)
Dept: RADIATION ONCOLOGY | Facility: MEDICAL CENTER | Age: 63
End: 2018-11-20

## 2018-11-20 PROCEDURE — 77427 RADIATION TX MANAGEMENT X5: CPT | Performed by: RADIOLOGY

## 2018-11-20 PROCEDURE — 77412 RADIATION TX DELIVERY LVL 3: CPT | Performed by: RADIOLOGY

## 2018-11-21 ENCOUNTER — OUTPATIENT INFUSION SERVICES (OUTPATIENT)
Dept: ONCOLOGY | Facility: MEDICAL CENTER | Age: 63
End: 2018-11-21
Attending: INTERNAL MEDICINE
Payer: COMMERCIAL

## 2018-11-21 VITALS
WEIGHT: 162.48 LBS | BODY MASS INDEX: 27.74 KG/M2 | HEART RATE: 94 BPM | RESPIRATION RATE: 18 BRPM | SYSTOLIC BLOOD PRESSURE: 100 MMHG | TEMPERATURE: 98.2 F | HEIGHT: 64 IN | OXYGEN SATURATION: 97 % | DIASTOLIC BLOOD PRESSURE: 60 MMHG

## 2018-11-21 DIAGNOSIS — C50.912 MALIGNANT NEOPLASM OF LEFT BREAST IN FEMALE, ESTROGEN RECEPTOR POSITIVE, UNSPECIFIED SITE OF BREAST (HCC): ICD-10-CM

## 2018-11-21 DIAGNOSIS — Z17.0 MALIGNANT NEOPLASM OF LEFT BREAST IN FEMALE, ESTROGEN RECEPTOR POSITIVE, UNSPECIFIED SITE OF BREAST (HCC): ICD-10-CM

## 2018-11-21 PROCEDURE — 700111 HCHG RX REV CODE 636 W/ 250 OVERRIDE (IP): Performed by: NURSE PRACTITIONER

## 2018-11-21 PROCEDURE — 700111 HCHG RX REV CODE 636 W/ 250 OVERRIDE (IP): Performed by: INTERNAL MEDICINE

## 2018-11-21 PROCEDURE — 700105 HCHG RX REV CODE 258: Performed by: INTERNAL MEDICINE

## 2018-11-21 PROCEDURE — A4212 NON CORING NEEDLE OR STYLET: HCPCS

## 2018-11-21 PROCEDURE — 77412 RADIATION TX DELIVERY LVL 3: CPT | Performed by: RADIOLOGY

## 2018-11-21 PROCEDURE — 96413 CHEMO IV INFUSION 1 HR: CPT

## 2018-11-21 PROCEDURE — 700101 HCHG RX REV CODE 250

## 2018-11-21 RX ORDER — LIDOCAINE HYDROCHLORIDE 10 MG/ML
INJECTION, SOLUTION INFILTRATION; PERINEURAL
Status: COMPLETED
Start: 2018-11-21 | End: 2018-11-21

## 2018-11-21 RX ORDER — 0.9 % SODIUM CHLORIDE 0.9 %
VIAL (ML) INJECTION PRN
Status: CANCELLED | OUTPATIENT
Start: 2018-11-21

## 2018-11-21 RX ORDER — 0.9 % SODIUM CHLORIDE 0.9 %
5 VIAL (ML) INJECTION PRN
Status: CANCELLED | OUTPATIENT
Start: 2018-11-21

## 2018-11-21 RX ORDER — PROCHLORPERAZINE MALEATE 10 MG
10 TABLET ORAL EVERY 6 HOURS PRN
Status: CANCELLED | OUTPATIENT
Start: 2018-11-21

## 2018-11-21 RX ORDER — ONDANSETRON 8 MG/1
8 TABLET, ORALLY DISINTEGRATING ORAL
Status: CANCELLED | OUTPATIENT
Start: 2018-11-21

## 2018-11-21 RX ORDER — SODIUM CHLORIDE 9 MG/ML
INJECTION, SOLUTION INTRAVENOUS CONTINUOUS
Status: CANCELLED | OUTPATIENT
Start: 2018-11-21

## 2018-11-21 RX ORDER — ONDANSETRON 2 MG/ML
4 INJECTION INTRAMUSCULAR; INTRAVENOUS
Status: CANCELLED | OUTPATIENT
Start: 2018-11-21

## 2018-11-21 RX ADMIN — TRASTUZUMAB 450 MG: 150 INJECTION, POWDER, LYOPHILIZED, FOR SOLUTION INTRAVENOUS at 16:13

## 2018-11-21 RX ADMIN — HEPARIN 500 UNITS: 100 SYRINGE at 16:55

## 2018-11-21 RX ADMIN — LIDOCAINE HYDROCHLORIDE 0.5 ML: 10 INJECTION, SOLUTION INFILTRATION; PERINEURAL at 15:40

## 2018-11-21 RX ADMIN — Medication 0.5 ML: at 15:40

## 2018-11-21 ASSESSMENT — PAIN SCALES - GENERAL: PAINLEVEL_OUTOF10: 5

## 2018-11-21 NOTE — PROGRESS NOTES
Chemotherapy Verification - PRIMARY RN      Height = 1.635 m  Weight = 73.7 kg  BSA = 1.83 m2       Medication: trastuzumab  Dose: 6 mg/kg  Calculated Dose: 442.2 mg                             (In mg/m2, AUC, mg/kg)       I confirm this process was performed independently with the BSA and all final chemotherapy dosing calculations congruent.  Any discrepancies of 5% or greater have been addressed with the chemotherapy pharmacist. The resolution of the discrepancy has been documented in the EPIC progress notes.

## 2018-11-21 NOTE — PROGRESS NOTES
Chemotherapy Verification - SECONDARY RN       Height = 163.5 cm  Weight = 73.7 kg  BSA = 1.83 m2       Medication: Herceptin  Dose: 6 mg/kg  Calculated Dose: 442.2 mg                             (In mg/m2, AUC, mg/kg)         I confirm that this process was performed independently.

## 2018-11-21 NOTE — PROGRESS NOTES
"Patient Name: Thao Wan     Protocol: TCH       *Dosing Reference*  Trastuzumab 8 mg/kg IV over 90 days on Day 1 of Cycle 1 (completed in [place], [place]) followed by  Trastuzumab 6 mg/kg IV over 30 minutes on Day 1 beginning with Cycle 2  21-day cycle until DP or UT  NCCN Guidelines for Breast Cancer V.1.2018  Barbie JUARES et al - Onkologie. 2010;33(8-9):425-30    Dx: Breast cancer HER2 positive - recurrent        Allergies:  Patient has no known allergies.       /60   Pulse 94   Temp 36.8 °C (98.2 °F) (Temporal)   Resp 18   Ht 1.635 m (5' 4.37\")   Wt 73.7 kg (162 lb 7.7 oz)   LMP  (LMP Unknown)   SpO2 97%   BMI 27.57 kg/m²  Body surface area is 1.83 meters squared.    No labs necessary    Labs 10/29/18   ECHO LVEF 60%     Drug Order   (Drug name, dose, route, IV Fluid & volume, frequency, number of doses) Cycle: C2 - Herceptin maintenance      Previous treatment: C1 Herceptin maintenance on 10/30/18; s/p 6 cycles of TCH in [place], [place], last tx 10/2/18     Medication = Trastuzumab  Base Dose= 6 mg/kg  Calc Dose: Base Dose x 73.7 kg = 442.2 mg  Final Dose = 450 mg  Route = IV  Fluid & Volume =  mL  Admin Duration = Over 30 minutes          <5% difference, okay to treat with final dose     By my signature below, I confirm this process was performed independently with the BSA and all final chemotherapy dosing calculations congruent. I have reviewed the above chemotherapy order and that my calculation of the final dose and BSA (when applicable) corroborate those calculations of the  pharmacist. Discrepancies of 5% or greater in the written dose have been addressed and documented within the [facility] Progress notes.    Aakash Haque, PharmD    "

## 2018-11-22 NOTE — PROGRESS NOTES
Pt ambulatory to [facility] for C8 of Herceptin.  Pt w/ no s/s of infection, pt has no complaints at this time.  Pt PORT site numbed w/ lidocaine per pt request, PORT accessed using sterile fashion, brisk blood return noted, flushed per protocol.  Herceptin infused w/ no adverse reactions.  PORT w/ brisk blood return post-infusion, flushed per protocol, heparinized and de-accessed, gauze and tegaderm bandage applied.  Pt left on foot in NAD.  Confirmed pt's next appt.

## 2018-11-26 ENCOUNTER — HOSPITAL ENCOUNTER (OUTPATIENT)
Dept: RADIATION ONCOLOGY | Facility: MEDICAL CENTER | Age: 63
End: 2018-11-26

## 2018-11-26 PROCEDURE — 77412 RADIATION TX DELIVERY LVL 3: CPT | Performed by: RADIOLOGY

## 2018-11-27 ENCOUNTER — HOSPITAL ENCOUNTER (OUTPATIENT)
Dept: RADIATION ONCOLOGY | Facility: MEDICAL CENTER | Age: 63
End: 2018-11-27

## 2018-11-27 PROCEDURE — 77417 THER RADIOLOGY PORT IMAGE(S): CPT | Performed by: RADIOLOGY

## 2018-11-27 PROCEDURE — 77412 RADIATION TX DELIVERY LVL 3: CPT | Performed by: RADIOLOGY

## 2018-11-27 PROCEDURE — 77336 RADIATION PHYSICS CONSULT: CPT | Performed by: RADIOLOGY

## 2018-11-28 ENCOUNTER — HOSPITAL ENCOUNTER (OUTPATIENT)
Dept: RADIATION ONCOLOGY | Facility: MEDICAL CENTER | Age: 63
End: 2018-11-28

## 2018-11-28 PROCEDURE — 77412 RADIATION TX DELIVERY LVL 3: CPT | Performed by: RADIOLOGY

## 2018-11-29 ENCOUNTER — HOSPITAL ENCOUNTER (OUTPATIENT)
Dept: RADIATION ONCOLOGY | Facility: MEDICAL CENTER | Age: 63
End: 2018-11-29

## 2018-11-29 PROCEDURE — 77427 RADIATION TX MANAGEMENT X5: CPT | Performed by: RADIOLOGY

## 2018-11-29 PROCEDURE — 77334 RADIATION TREATMENT AID(S): CPT | Mod: 26 | Performed by: RADIOLOGY

## 2018-11-29 PROCEDURE — 77412 RADIATION TX DELIVERY LVL 3: CPT | Performed by: RADIOLOGY

## 2018-11-29 PROCEDURE — 77334 RADIATION TREATMENT AID(S): CPT | Performed by: RADIOLOGY

## 2018-11-29 PROCEDURE — 77307 TELETHX ISODOSE PLAN CPLX: CPT | Mod: 26 | Performed by: RADIOLOGY

## 2018-11-29 PROCEDURE — 77307 TELETHX ISODOSE PLAN CPLX: CPT | Performed by: RADIOLOGY

## 2018-11-30 ENCOUNTER — HOSPITAL ENCOUNTER (OUTPATIENT)
Dept: RADIATION ONCOLOGY | Facility: MEDICAL CENTER | Age: 63
End: 2018-11-30

## 2018-11-30 PROCEDURE — 77412 RADIATION TX DELIVERY LVL 3: CPT | Performed by: RADIOLOGY

## 2018-12-03 ENCOUNTER — HOSPITAL ENCOUNTER (OUTPATIENT)
Dept: RADIATION ONCOLOGY | Facility: MEDICAL CENTER | Age: 63
End: 2018-12-03

## 2018-12-03 ENCOUNTER — HOSPITAL ENCOUNTER (OUTPATIENT)
Dept: RADIATION ONCOLOGY | Facility: MEDICAL CENTER | Age: 63
End: 2018-12-31
Attending: RADIOLOGY
Payer: COMMERCIAL

## 2018-12-03 PROCEDURE — 77412 RADIATION TX DELIVERY LVL 3: CPT | Performed by: RADIOLOGY

## 2018-12-04 ENCOUNTER — HOSPITAL ENCOUNTER (OUTPATIENT)
Dept: RADIATION ONCOLOGY | Facility: MEDICAL CENTER | Age: 63
End: 2018-12-04

## 2018-12-04 PROCEDURE — 77412 RADIATION TX DELIVERY LVL 3: CPT | Performed by: RADIOLOGY

## 2018-12-04 PROCEDURE — 77336 RADIATION PHYSICS CONSULT: CPT | Performed by: RADIOLOGY

## 2018-12-05 PROCEDURE — 77280 THER RAD SIMULAJ FIELD SMPL: CPT | Performed by: RADIOLOGY

## 2018-12-05 PROCEDURE — 77412 RADIATION TX DELIVERY LVL 3: CPT | Performed by: RADIOLOGY

## 2018-12-06 ENCOUNTER — HOSPITAL ENCOUNTER (OUTPATIENT)
Dept: RADIATION ONCOLOGY | Facility: MEDICAL CENTER | Age: 63
End: 2018-12-06

## 2018-12-06 PROCEDURE — 77427 RADIATION TX MANAGEMENT X5: CPT | Performed by: RADIOLOGY

## 2018-12-06 PROCEDURE — 77412 RADIATION TX DELIVERY LVL 3: CPT | Performed by: RADIOLOGY

## 2018-12-07 ENCOUNTER — HOSPITAL ENCOUNTER (OUTPATIENT)
Dept: RADIATION ONCOLOGY | Facility: MEDICAL CENTER | Age: 63
End: 2018-12-07

## 2018-12-07 PROCEDURE — 77412 RADIATION TX DELIVERY LVL 3: CPT | Performed by: RADIOLOGY

## 2018-12-09 NOTE — PROGRESS NOTES
"Patient Name: Thao Wan     Protocol: TCH       *Dosing Reference*  Trastuzumab 8 mg/kg IV over 90 days on Day 1 of Cycle 1 (completed in [place], [place]) followed by  Trastuzumab 6 mg/kg IV over 30 minutes on Day 1 beginning with Cycle 2  21-day cycle until DP or UT  NCCN Guidelines for Breast Cancer V.1.2018  Barbie JUARES et al - Onkologie. 2010;33(8-9):425-30    Dx: Breast cancer HER2 positive - recurrent        Allergies:  Patient has no known allergies.       /61   Pulse 89   Temp 37.3 °C (99.2 °F) (Temporal)   Resp 18   Ht 1.635 m (5' 4.37\")   Wt 73.9 kg (162 lb 14.7 oz)   SpO2 93%   BMI 27.64 kg/m²  Body surface area is 1.83 meters squared.    No labs necessary    Labs 10/29/18 - ECHO LVEF 60%     Drug Order   (Drug name, dose, route, IV Fluid & volume, frequency, number of doses) Cycle: C3 - Herceptin maintenance      Previous treatment: C2 Herceptin maintenance on 11/21/18; s/p 6 cycles of TCH in [place], [place], last tx 10/2/18     Medication = Trastuzumab  Base Dose= 6 mg/kg  Calc Dose: Base Dose x 73.9 kg = 443.4 mg  Final Dose = 450 mg  Route = IV  Fluid & Volume =  mL  Admin Duration = Over 30 minutes          <5% difference, okay to treat with final dose     By my signature below, I confirm this process was performed independently with the BSA and all final chemotherapy dosing calculations congruent. I have reviewed the above chemotherapy order and that my calculation of the final dose and BSA (when applicable) corroborate those calculations of the  pharmacist. Discrepancies of 5% or greater in the written dose have been addressed and documented within the [facility] Progress notes.    Aakash Haque, PharmD    "

## 2018-12-10 ENCOUNTER — HOSPITAL ENCOUNTER (OUTPATIENT)
Dept: RADIATION ONCOLOGY | Facility: MEDICAL CENTER | Age: 63
End: 2018-12-10

## 2018-12-10 PROCEDURE — 77412 RADIATION TX DELIVERY LVL 3: CPT | Performed by: RADIOLOGY

## 2018-12-11 ENCOUNTER — HOSPITAL ENCOUNTER (OUTPATIENT)
Dept: RADIATION ONCOLOGY | Facility: MEDICAL CENTER | Age: 63
End: 2018-12-11

## 2018-12-11 PROCEDURE — 77336 RADIATION PHYSICS CONSULT: CPT | Performed by: RADIOLOGY

## 2018-12-11 PROCEDURE — 77412 RADIATION TX DELIVERY LVL 3: CPT | Performed by: RADIOLOGY

## 2018-12-11 RX ORDER — SODIUM CHLORIDE 9 MG/ML
INJECTION, SOLUTION INTRAVENOUS CONTINUOUS
Status: CANCELLED | OUTPATIENT
Start: 2018-12-11

## 2018-12-11 RX ORDER — 0.9 % SODIUM CHLORIDE 0.9 %
5 VIAL (ML) INJECTION PRN
Status: CANCELLED | OUTPATIENT
Start: 2018-12-11

## 2018-12-11 RX ORDER — 0.9 % SODIUM CHLORIDE 0.9 %
VIAL (ML) INJECTION PRN
Status: CANCELLED | OUTPATIENT
Start: 2018-12-11

## 2018-12-11 RX ORDER — ONDANSETRON 2 MG/ML
4 INJECTION INTRAMUSCULAR; INTRAVENOUS
Status: CANCELLED | OUTPATIENT
Start: 2018-12-11

## 2018-12-11 RX ORDER — PROCHLORPERAZINE MALEATE 10 MG
10 TABLET ORAL EVERY 6 HOURS PRN
Status: CANCELLED | OUTPATIENT
Start: 2018-12-11

## 2018-12-11 RX ORDER — ONDANSETRON 8 MG/1
8 TABLET, ORALLY DISINTEGRATING ORAL
Status: CANCELLED | OUTPATIENT
Start: 2018-12-11

## 2018-12-12 ENCOUNTER — HOSPITAL ENCOUNTER (OUTPATIENT)
Facility: MEDICAL CENTER | Age: 63
End: 2018-12-12
Attending: NURSE PRACTITIONER
Payer: COMMERCIAL

## 2018-12-12 ENCOUNTER — OUTPATIENT INFUSION SERVICES (OUTPATIENT)
Dept: ONCOLOGY | Facility: MEDICAL CENTER | Age: 63
End: 2018-12-12
Attending: INTERNAL MEDICINE
Payer: COMMERCIAL

## 2018-12-12 ENCOUNTER — NON-PROVIDER VISIT (OUTPATIENT)
Dept: HEMATOLOGY ONCOLOGY | Facility: MEDICAL CENTER | Age: 63
End: 2018-12-12
Payer: COMMERCIAL

## 2018-12-12 ENCOUNTER — OFFICE VISIT (OUTPATIENT)
Dept: HEMATOLOGY ONCOLOGY | Facility: MEDICAL CENTER | Age: 63
End: 2018-12-12
Payer: COMMERCIAL

## 2018-12-12 VITALS
SYSTOLIC BLOOD PRESSURE: 96 MMHG | HEART RATE: 76 BPM | RESPIRATION RATE: 18 BRPM | BODY MASS INDEX: 27.96 KG/M2 | OXYGEN SATURATION: 96 % | HEIGHT: 64 IN | DIASTOLIC BLOOD PRESSURE: 60 MMHG | TEMPERATURE: 98.6 F | WEIGHT: 163.8 LBS

## 2018-12-12 VITALS
SYSTOLIC BLOOD PRESSURE: 96 MMHG | WEIGHT: 163.8 LBS | HEIGHT: 64 IN | OXYGEN SATURATION: 96 % | HEART RATE: 76 BPM | RESPIRATION RATE: 18 BRPM | DIASTOLIC BLOOD PRESSURE: 60 MMHG | TEMPERATURE: 98.6 F | BODY MASS INDEX: 27.96 KG/M2

## 2018-12-12 VITALS
SYSTOLIC BLOOD PRESSURE: 102 MMHG | BODY MASS INDEX: 27.81 KG/M2 | TEMPERATURE: 99.2 F | OXYGEN SATURATION: 93 % | RESPIRATION RATE: 18 BRPM | DIASTOLIC BLOOD PRESSURE: 61 MMHG | HEIGHT: 64 IN | WEIGHT: 162.92 LBS | HEART RATE: 89 BPM

## 2018-12-12 DIAGNOSIS — C50.012 MALIGNANT NEOPLASM OF NIPPLE OF LEFT BREAST IN FEMALE, ESTROGEN RECEPTOR POSITIVE (HCC): ICD-10-CM

## 2018-12-12 DIAGNOSIS — C50.011 MALIGNANT NEOPLASM OF NIPPLE OF RIGHT BREAST IN FEMALE, UNSPECIFIED ESTROGEN RECEPTOR STATUS (HCC): ICD-10-CM

## 2018-12-12 DIAGNOSIS — C50.912 MALIGNANT NEOPLASM OF LEFT BREAST IN FEMALE, ESTROGEN RECEPTOR POSITIVE, UNSPECIFIED SITE OF BREAST (HCC): ICD-10-CM

## 2018-12-12 DIAGNOSIS — Z17.0 MALIGNANT NEOPLASM OF NIPPLE OF LEFT BREAST IN FEMALE, ESTROGEN RECEPTOR POSITIVE (HCC): ICD-10-CM

## 2018-12-12 DIAGNOSIS — Z17.0 MALIGNANT NEOPLASM OF LEFT BREAST IN FEMALE, ESTROGEN RECEPTOR POSITIVE, UNSPECIFIED SITE OF BREAST (HCC): ICD-10-CM

## 2018-12-12 LAB
ALBUMIN SERPL BCP-MCNC: 4.1 G/DL (ref 3.2–4.9)
ALBUMIN/GLOB SERPL: 1.5 G/DL
ALP SERPL-CCNC: 62 U/L (ref 30–99)
ALT SERPL-CCNC: 15 U/L (ref 2–50)
ANION GAP SERPL CALC-SCNC: 7 MMOL/L (ref 0–11.9)
AST SERPL-CCNC: 13 U/L (ref 12–45)
BASOPHILS # BLD AUTO: 0.5 % (ref 0–1.8)
BASOPHILS # BLD: 0.04 K/UL (ref 0–0.12)
BILIRUB SERPL-MCNC: 0.4 MG/DL (ref 0.1–1.5)
BUN SERPL-MCNC: 16 MG/DL (ref 8–22)
CALCIUM SERPL-MCNC: 9.5 MG/DL (ref 8.5–10.5)
CHLORIDE SERPL-SCNC: 108 MMOL/L (ref 96–112)
CO2 SERPL-SCNC: 25 MMOL/L (ref 20–33)
CREAT SERPL-MCNC: 0.69 MG/DL (ref 0.5–1.4)
EOSINOPHIL # BLD AUTO: 0.11 K/UL (ref 0–0.51)
EOSINOPHIL NFR BLD: 1.5 % (ref 0–6.9)
ERYTHROCYTE [DISTWIDTH] IN BLOOD BY AUTOMATED COUNT: 47.7 FL (ref 35.9–50)
GLOBULIN SER CALC-MCNC: 2.7 G/DL (ref 1.9–3.5)
GLUCOSE SERPL-MCNC: 115 MG/DL (ref 65–99)
HCT VFR BLD AUTO: 37.6 % (ref 37–47)
HGB BLD-MCNC: 12 G/DL (ref 12–16)
IMM GRANULOCYTES # BLD AUTO: 0.02 K/UL (ref 0–0.11)
IMM GRANULOCYTES NFR BLD AUTO: 0.3 % (ref 0–0.9)
LYMPHOCYTES # BLD AUTO: 1.02 K/UL (ref 1–4.8)
LYMPHOCYTES NFR BLD: 13.8 % (ref 22–41)
MCH RBC QN AUTO: 30.6 PG (ref 27–33)
MCHC RBC AUTO-ENTMCNC: 31.9 G/DL (ref 33.6–35)
MCV RBC AUTO: 95.9 FL (ref 81.4–97.8)
MONOCYTES # BLD AUTO: 0.56 K/UL (ref 0–0.85)
MONOCYTES NFR BLD AUTO: 7.6 % (ref 0–13.4)
NEUTROPHILS # BLD AUTO: 5.66 K/UL (ref 2–7.15)
NEUTROPHILS NFR BLD: 76.3 % (ref 44–72)
NRBC # BLD AUTO: 0 K/UL
NRBC BLD-RTO: 0 /100 WBC
PLATELET # BLD AUTO: 248 K/UL (ref 164–446)
PMV BLD AUTO: 8.9 FL (ref 9–12.9)
POTASSIUM SERPL-SCNC: 4 MMOL/L (ref 3.6–5.5)
PROT SERPL-MCNC: 6.8 G/DL (ref 6–8.2)
RBC # BLD AUTO: 3.92 M/UL (ref 4.2–5.4)
SODIUM SERPL-SCNC: 140 MMOL/L (ref 135–145)
WBC # BLD AUTO: 7.4 K/UL (ref 4.8–10.8)

## 2018-12-12 PROCEDURE — 700111 HCHG RX REV CODE 636 W/ 250 OVERRIDE (IP): Performed by: NURSE PRACTITIONER

## 2018-12-12 PROCEDURE — 700111 HCHG RX REV CODE 636 W/ 250 OVERRIDE (IP)

## 2018-12-12 PROCEDURE — 80053 COMPREHEN METABOLIC PANEL: CPT

## 2018-12-12 PROCEDURE — 700105 HCHG RX REV CODE 258: Performed by: NURSE PRACTITIONER

## 2018-12-12 PROCEDURE — 85025 COMPLETE CBC W/AUTO DIFF WBC: CPT

## 2018-12-12 PROCEDURE — 96413 CHEMO IV INFUSION 1 HR: CPT

## 2018-12-12 PROCEDURE — 36591 DRAW BLOOD OFF VENOUS DEVICE: CPT | Performed by: INTERNAL MEDICINE

## 2018-12-12 PROCEDURE — 99214 OFFICE O/P EST MOD 30 MIN: CPT | Performed by: NURSE PRACTITIONER

## 2018-12-12 RX ORDER — ANASTROZOLE 1 MG/1
1 TABLET ORAL DAILY
Qty: 30 TAB | Refills: 3 | Status: SHIPPED | OUTPATIENT
Start: 2018-12-12 | End: 2019-04-09 | Stop reason: SDUPTHER

## 2018-12-12 RX ORDER — SODIUM CHLORIDE 9 MG/ML
INJECTION, SOLUTION INTRAVENOUS CONTINUOUS
Status: DISCONTINUED | OUTPATIENT
Start: 2018-12-12 | End: 2018-12-12 | Stop reason: HOSPADM

## 2018-12-12 RX ADMIN — SODIUM CHLORIDE: 9 INJECTION, SOLUTION INTRAVENOUS at 14:13

## 2018-12-12 RX ADMIN — HEPARIN 500 UNITS: 100 SYRINGE at 14:57

## 2018-12-12 RX ADMIN — TRASTUZUMAB 450 MG: 150 INJECTION, POWDER, LYOPHILIZED, FOR SOLUTION INTRAVENOUS at 14:12

## 2018-12-12 ASSESSMENT — ENCOUNTER SYMPTOMS
VOMITING: 0
DIZZINESS: 0
ABDOMINAL PAIN: 0
PALPITATIONS: 0
WEIGHT LOSS: 0
COUGH: 0
CHILLS: 0
FEVER: 0
DIARRHEA: 0
SHORTNESS OF BREATH: 0
CONSTIPATION: 0
NAUSEA: 0
WHEEZING: 0
TINGLING: 0
DIAPHORESIS: 0
WEAKNESS: 0

## 2018-12-12 ASSESSMENT — PAIN SCALES - GENERAL
PAINLEVEL_OUTOF10: 8
PAINLEVEL: 8=MODERATE-SEVERE PAIN
PAINLEVEL: 8=MODERATE-SEVERE PAIN

## 2018-12-12 NOTE — PROGRESS NOTES
Chemotherapy Verification - PRIMARY RN      Height = 163.5 cm  Weight = 73.9 kg  BSA = 1.83 m2       Medication: trastuzumab  Dose: 6 mg/kg  Calculated Dose: 443.4 mg                             (In mg/m2, AUC, mg/kg)       I confirm this process was performed independently with the BSA and all final chemotherapy dosing calculations congruent.  Any discrepancies of 5% or greater have been addressed with the chemotherapy pharmacist. The resolution of the discrepancy has been documented in the EPIC progress notes.

## 2018-12-12 NOTE — PROGRESS NOTES
Chemotherapy Verification - SECONDARY RN       Height = 163.5cm  Weight = 73.9kg  BSA = 1.83m2       Medication: Herceptin  Dose: 6mg/kg  Calculated Dose: 443.4mg                             (In mg/m2, AUC, mg/kg)     I confirm that this process was performed independently.

## 2018-12-12 NOTE — PROGRESS NOTES
Patient ambulatory to clinic today for port access and labs prior to later tx today in [facility]. Patient pleasant, alert and oriented x3. RN performed Port access per protocol using sterile technique using a 22g inch alberto needle system and added biopatch. Brisk blood return noted and after waste labs obtained. Port flushed with 20cc NS followed by heparin. Patient tolerated procedure well and ambulatory to waiting room for next appointment with APRN.

## 2018-12-12 NOTE — PROGRESS NOTES
Subjective:      Thao Wan is a 63 y.o. female who presents with Follow-Up (prechemo (Dr. Harper)) breast cancer.        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  She presents accompanied by her  for today's visit.  Patient currently scheduled to receive cycle #3 of single agent Herceptin today.    Patient overall is doing very well.  She recently completed 6 weeks of radiation therapy yesterday and does have residual radiation burns to the left axillary area.  She does have continued fatigue but it is tolerable.  Otherwise overall she is doing very well and tolerating single agent Herceptin.    Discussion with patient regarding starting aromatase inhibitor, Arimidex.  She had previously spoken with Dr. Harper regarding medication and further discussion with patient regarding side effects and symptoms of medication including myalgias and hot flashes.    No Known Allergies    Current Outpatient Prescriptions on File Prior to Visit   Medication Sig Dispense Refill   • Multiple Vitamins-Minerals (OCUVITE-LUTEIN) Tab Take 1 tablet by mouth every day.     • multivitamin (THERAGRAN) Tab Take 1 Tab by mouth every day.     • vitamin D (CHOLECALCIFEROL) 1000 UNIT Tab Take 1,000 Units by mouth every day.     • Omega-3 Fatty Acids (FISH OIL) 1000 MG Cap capsule Take 1,000 mg by mouth 3 times a day, with meals.     • pantoprazole (PROTONIX) 40 MG Tablet Delayed Response Take 40 mg by mouth every day.     • dexamethasone (DECADRON) 4 MG Tab Take 4 mg by mouth 2 times a day.     • Naproxen Sodium (ALEVE PO) Take  by mouth.     • acetaminophen (TYLENOL) 325 MG Tab Take 650 mg by mouth every four hours as needed.     • ondansetron (ZOFRAN ODT) 4 MG TABLET DISPERSIBLE Take 4 mg by mouth every 6 hours as needed for Nausea.     • Lidocaine-Prilocaine (EMLA EX) by Apply externally route.     •  "prochlorperazine (COMPAZINE) 10 MG Tab Take 10 mg by mouth every 6 hours as needed.       No current facility-administered medications on file prior to visit.        Review of Systems   Constitutional: Positive for malaise/fatigue. Negative for chills, diaphoresis, fever and weight loss.   Respiratory: Negative for cough, shortness of breath and wheezing.    Cardiovascular: Negative for chest pain and palpitations.   Gastrointestinal: Negative for abdominal pain, constipation, diarrhea, nausea and vomiting.   Genitourinary: Negative for dysuria.   Skin:        Redness, dryness, tenderness left axilla from radiation, using recommended creams   Neurological: Negative for dizziness, tingling and weakness.          Objective:     BP (!) 96/60 (Patient Position: Sitting, BP Cuff Size: Adult)   Pulse 76   Temp 37 °C (98.6 °F) (Temporal)   Resp 18   Ht 1.635 m (5' 4.37\")   Wt 74.3 kg (163 lb 12.8 oz)   LMP  (LMP Unknown)   SpO2 96%   Breastfeeding? Unknown   BMI 27.79 kg/m²       Physical Exam   Constitutional: She is oriented to person, place, and time. She appears well-developed and well-nourished. No distress.   HENT:   Head: Normocephalic and atraumatic.   Mouth/Throat: Oropharynx is clear and moist. No oropharyngeal exudate.   Eyes: Conjunctivae are normal.   Cardiovascular: Normal rate, regular rhythm, normal heart sounds and intact distal pulses.  Exam reveals no gallop and no friction rub.    No murmur heard.  Pulmonary/Chest: Effort normal and breath sounds normal. No respiratory distress. She has no wheezes.   Musculoskeletal: Normal range of motion. She exhibits no edema or tenderness.   Neurological: She is alert and oriented to person, place, and time.   Skin: Skin is warm and dry. No rash noted. She is not diaphoretic. There is erythema (radiation burns noted to the left axilla). No pallor.   Psychiatric: She has a normal mood and affect. Her behavior is normal.   Vitals reviewed.    Hospital " Outpatient Visit on 12/12/2018   Component Date Value Ref Range Status   • WBC 12/12/2018 7.4  4.8 - 10.8 K/uL Final   • RBC 12/12/2018 3.92* 4.20 - 5.40 M/uL Final   • Hemoglobin 12/12/2018 12.0  12.0 - 16.0 g/dL Final   • Hematocrit 12/12/2018 37.6  37.0 - 47.0 % Final   • MCV 12/12/2018 95.9  81.4 - 97.8 fL Final   • MCH 12/12/2018 30.6  27.0 - 33.0 pg Final   • MCHC 12/12/2018 31.9* 33.6 - 35.0 g/dL Final   • RDW 12/12/2018 47.7  35.9 - 50.0 fL Final   • Platelet Count 12/12/2018 248  164 - 446 K/uL Final   • MPV 12/12/2018 8.9* 9.0 - 12.9 fL Final   • Neutrophils-Polys 12/12/2018 76.30* 44.00 - 72.00 % Final   • Lymphocytes 12/12/2018 13.80* 22.00 - 41.00 % Final   • Monocytes 12/12/2018 7.60  0.00 - 13.40 % Final   • Eosinophils 12/12/2018 1.50  0.00 - 6.90 % Final   • Basophils 12/12/2018 0.50  0.00 - 1.80 % Final   • Immature Granulocytes 12/12/2018 0.30  0.00 - 0.90 % Final   • Nucleated RBC 12/12/2018 0.00  /100 WBC Final   • Neutrophils (Absolute) 12/12/2018 5.66  2.00 - 7.15 K/uL Final    Includes immature neutrophils, if present.   • Lymphs (Absolute) 12/12/2018 1.02  1.00 - 4.80 K/uL Final   • Monos (Absolute) 12/12/2018 0.56  0.00 - 0.85 K/uL Final   • Eos (Absolute) 12/12/2018 0.11  0.00 - 0.51 K/uL Final   • Baso (Absolute) 12/12/2018 0.04  0.00 - 0.12 K/uL Final   • Immature Granulocytes (abs) 12/12/2018 0.02  0.00 - 0.11 K/uL Final   • NRBC (Absolute) 12/12/2018 0.00  K/uL Final   • Sodium 12/12/2018 140  135 - 145 mmol/L Final   • Potassium 12/12/2018 4.0  3.6 - 5.5 mmol/L Final   • Chloride 12/12/2018 108  96 - 112 mmol/L Final   • Co2 12/12/2018 25  20 - 33 mmol/L Final   • Anion Gap 12/12/2018 7.0  0.0 - 11.9 Final   • Glucose 12/12/2018 115* 65 - 99 mg/dL Final   • Bun 12/12/2018 16  8 - 22 mg/dL Final   • Creatinine 12/12/2018 0.69  0.50 - 1.40 mg/dL Final   • Calcium 12/12/2018 9.5  8.5 - 10.5 mg/dL Final   • AST(SGOT) 12/12/2018 13  12 - 45 U/L Final   • ALT(SGPT) 12/12/2018 15  2 - 50 U/L  Final   • Alkaline Phosphatase 12/12/2018 62  30 - 99 U/L Final   • Total Bilirubin 12/12/2018 0.4  0.1 - 1.5 mg/dL Final   • Albumin 12/12/2018 4.1  3.2 - 4.9 g/dL Final   • Total Protein 12/12/2018 6.8  6.0 - 8.2 g/dL Final   • Globulin 12/12/2018 2.7  1.9 - 3.5 g/dL Final   • A-G Ratio 12/12/2018 1.5  g/dL Final   • GFR If  12/12/2018 >60  >60 mL/min/1.73 m 2 Final   • GFR If Non  12/12/2018 >60  >60 mL/min/1.73 m 2 Final          Assessment/Plan:     1. Malignant neoplasm of nipple of left breast in female, estrogen receptor positive (HCC)  anastrozole (ARIMIDEX) 1 MG Tab     Plan  1.  Patient overall is doing very well.  She is continued on single agent Herceptin and is scheduled to receive cycle #3 of treatment.  She is to complete one full year of Herceptin as planned.  Clinically she is very stable and I was able to review her CBC and CMP and labs are appropriate to proceed with treatment as planned.    2.  Patient recently completed radiation therapy.  She had previous discussions with Dr. Harper regarding starting aromatase inhibitor including Arimidex.  Further discussion with patient had today regarding symptoms and side effects.  I discussed with Dr. Harper and he is okay for patient to start Arimidex therapy at this time.  A prescription for medication has been sent to her pharmacy.  Patient to contact office should she have any significant concerns or side effects.    3.  Patient to follow-up in the clinic in approximately 6 weeks or sooner if needed.

## 2018-12-12 NOTE — PROGRESS NOTES
"Pharmacy Chemotherapy Verification:    Patient Name: Thao Wan  DX: Recurrent breast cancer    Cycle 3 Herceptin maintenance  Previous treatment = 11/21/18    Regimen: Maintenance trastuzumab (Herceptin)  Trastuzumab 8 mg/kg IV loading dose over 90 min on C1D1  Trastuzumab 6 mg/kg IV over 30 min on day 1 beginning Cycle 2  q21 days until DP/UT  NCCN Guidelines for Breast cancer V.1.2018  Barbie JUARES et al - Onkologie. 2010;33(8-9):425-30. doi: 10.1159/942491655. Epub 2010 Jul 27.    Allergies:Patient has no known allergies.  /61   Pulse 89   Temp 37.3 °C (99.2 °F) (Temporal)   Resp 18   Ht 1.635 m (5' 4.37\")   Wt 73.9 kg (162 lb 14.7 oz)   SpO2 93%   BMI 27.64 kg/m²   Body surface area is 1.83 meters squared.     ANC~ 5660 Plt = 248k   Hgb = 12     SCr = 0.69mg/dL CrCl ~ 96mL/min (min Scr = 0.7)   LFT's = WNL TBili = 0.4     ECHO 10/29/18   LVEF ~ 60%    Trastuzumab 6 mg/kg  X 73.9kg = 443.4 mg    Rounded to vial size (within 10%) per dose rounding protocol.    OK to treat with final dose = 450 mg IV    GEOVANNA Corral, Pharm.D.          "

## 2018-12-13 NOTE — PROGRESS NOTES
Pt scheduled for trastuzumab. Arrived ambulatory, independent; accompanied by . Pt denied pain/discomfort, s/sx infection, or recent health changes. R-chest port accessed in MD's office 12/12/18; easily flushed, brisk blood return noted. Last echo completed 10/29/18; LVEF 60%. Infusion completed w/o adverse events. Port flushed, brisk blood return noted; hep locked, de-accessed. Treatment plan reviewed; pt verbalized knowledge of next appt; denied any unmet needs. Pt discharged ambulatory, independent, NAD; accompanied by .

## 2018-12-19 RX ORDER — 0.9 % SODIUM CHLORIDE 0.9 %
VIAL (ML) INJECTION PRN
Status: CANCELLED | OUTPATIENT
Start: 2019-01-01

## 2018-12-19 RX ORDER — 0.9 % SODIUM CHLORIDE 0.9 %
5 VIAL (ML) INJECTION PRN
Status: CANCELLED | OUTPATIENT
Start: 2019-01-01

## 2018-12-19 RX ORDER — SODIUM CHLORIDE 9 MG/ML
INJECTION, SOLUTION INTRAVENOUS CONTINUOUS
Status: CANCELLED | OUTPATIENT
Start: 2019-01-01

## 2018-12-19 RX ORDER — PROCHLORPERAZINE MALEATE 10 MG
10 TABLET ORAL EVERY 6 HOURS PRN
Status: CANCELLED | OUTPATIENT
Start: 2019-01-01

## 2018-12-19 RX ORDER — ONDANSETRON 2 MG/ML
4 INJECTION INTRAMUSCULAR; INTRAVENOUS
Status: CANCELLED | OUTPATIENT
Start: 2019-01-01

## 2018-12-19 RX ORDER — ONDANSETRON 8 MG/1
8 TABLET, ORALLY DISINTEGRATING ORAL
Status: CANCELLED | OUTPATIENT
Start: 2019-01-01

## 2019-01-02 ENCOUNTER — OUTPATIENT INFUSION SERVICES (OUTPATIENT)
Dept: ONCOLOGY | Facility: MEDICAL CENTER | Age: 64
End: 2019-01-02
Attending: INTERNAL MEDICINE
Payer: COMMERCIAL

## 2019-01-02 VITALS
OXYGEN SATURATION: 96 % | HEIGHT: 64 IN | SYSTOLIC BLOOD PRESSURE: 91 MMHG | DIASTOLIC BLOOD PRESSURE: 53 MMHG | HEART RATE: 86 BPM | TEMPERATURE: 97.5 F | WEIGHT: 160.05 LBS | BODY MASS INDEX: 27.33 KG/M2 | RESPIRATION RATE: 18 BRPM

## 2019-01-02 DIAGNOSIS — C50.912 MALIGNANT NEOPLASM OF LEFT BREAST IN FEMALE, ESTROGEN RECEPTOR POSITIVE, UNSPECIFIED SITE OF BREAST (HCC): ICD-10-CM

## 2019-01-02 DIAGNOSIS — Z17.0 MALIGNANT NEOPLASM OF LEFT BREAST IN FEMALE, ESTROGEN RECEPTOR POSITIVE, UNSPECIFIED SITE OF BREAST (HCC): ICD-10-CM

## 2019-01-02 PROCEDURE — 96413 CHEMO IV INFUSION 1 HR: CPT

## 2019-01-02 PROCEDURE — A4212 NON CORING NEEDLE OR STYLET: HCPCS

## 2019-01-02 PROCEDURE — 700105 HCHG RX REV CODE 258: Performed by: NURSE PRACTITIONER

## 2019-01-02 PROCEDURE — 700111 HCHG RX REV CODE 636 W/ 250 OVERRIDE (IP): Performed by: NURSE PRACTITIONER

## 2019-01-02 PROCEDURE — 700111 HCHG RX REV CODE 636 W/ 250 OVERRIDE (IP)

## 2019-01-02 RX ORDER — LIDOCAINE HYDROCHLORIDE 10 MG/ML
INJECTION, SOLUTION EPIDURAL; INFILTRATION; INTRACAUDAL; PERINEURAL
Status: COMPLETED
Start: 2019-01-02 | End: 2019-01-02

## 2019-01-02 RX ADMIN — LIDOCAINE HYDROCHLORIDE 2 ML: 10 INJECTION, SOLUTION EPIDURAL; INFILTRATION; INTRACAUDAL; PERINEURAL at 14:15

## 2019-01-02 RX ADMIN — SODIUM CHLORIDE, PRESERVATIVE FREE 500 UNITS: 5 INJECTION INTRAVENOUS at 15:06

## 2019-01-02 RX ADMIN — TRASTUZUMAB 450 MG: 150 INJECTION, POWDER, LYOPHILIZED, FOR SOLUTION INTRAVENOUS at 14:29

## 2019-01-02 ASSESSMENT — PAIN SCALES - GENERAL: PAINLEVEL_OUTOF10: 1

## 2019-01-02 NOTE — PROGRESS NOTES
Chemotherapy Verification - SECONDARY RN       Height = 163.5 cm  Weight = 72.6 kg  BSA = 1.82 m2       Medication: Herceptin  Dose: 6 mg/kg  Calculated Dose: 435.6 mg                             (In mg/m2, AUC, mg/kg)     I confirm that this process was performed independently.

## 2019-01-02 NOTE — PROGRESS NOTES
"Pharmacy Chemotherapy verification    Patient Name: Thao Wan   Dx: Breast cancer HER2 positive - recurrent     Protocol: Maintenance trastuzumab (Herceptin)    *Dosing Reference*  Trastuzumab 8 mg/kg IV over 90 days on Day 1 of Cycle 1 (completed in [place], [place]) followed by  Trastuzumab 6 mg/kg IV over 30 minutes on Day 1 beginning with Cycle 2  21-day cycle until DP or UT  NCCN Guidelines for Breast Cancer V.1.2018  Barbie JUARES, et al - Onkologie. 2010;33(8-9):425-30    Allergies:  Patient has no known allergies.       BP (!) 91/53   Pulse 86   Temp 36.4 °C (97.5 °F) (Temporal)   Resp 18   Ht 1.635 m (5' 4.37\")   Wt 72.6 kg (160 lb 0.9 oz)   LMP  (LMP Unknown)   SpO2 96%   BMI 27.16 kg/m²  Body surface area is 1.82 meters squared.    No labs required    10/29/18 - ECHO LVEF 60%     Drug Order   (Drug name, dose, route, IV Fluid & volume, frequency, number of doses) Cycle: C4 - Herceptin maintenance      Previous treatment: C3 12/12/18   Medication = Trastuzumab  Base Dose= 6 mg/kg  Calc Dose: Base Dose x 72.6kg = 435.6mg  Final Dose = 450 mg  Route = IV  Fluid & Volume =  mL  Admin Duration = Over 30 minutes          <5% difference, okay to treat with final dose     By my signature below, I confirm this process was performed independently with the BSA and all final chemotherapy dosing calculations congruent. I have reviewed the above chemotherapy order and that my calculation of the final dose and BSA (when applicable) corroborate those calculations of the  pharmacist. Discrepancies of 5% or greater in the written dose have been addressed and documented within the [facility] Progress notes.    Amira Corral, LisaD    "

## 2019-01-02 NOTE — PROGRESS NOTES
Patient presents for Herceptin infusion. Reviewed plan of care with patient and her , both verbalize understanding. Port accessed using sterile technique, flushes well with brisk blood return. Herceptin infused with no new patient complaints, line flushed clear. Port flushed per protocol and de-accessed, sterile gauze to site. Patient scheduled for next appointment and released in no acute distress with her .

## 2019-01-22 ENCOUNTER — HOSPITAL ENCOUNTER (OUTPATIENT)
Dept: RADIATION ONCOLOGY | Facility: MEDICAL CENTER | Age: 64
End: 2019-01-31
Attending: RADIOLOGY
Payer: COMMERCIAL

## 2019-01-22 VITALS
BODY MASS INDEX: 26.92 KG/M2 | DIASTOLIC BLOOD PRESSURE: 64 MMHG | TEMPERATURE: 97.7 F | OXYGEN SATURATION: 97 % | SYSTOLIC BLOOD PRESSURE: 97 MMHG | HEIGHT: 64 IN | HEART RATE: 71 BPM | WEIGHT: 157.7 LBS

## 2019-01-22 PROCEDURE — 99212 OFFICE O/P EST SF 10 MIN: CPT | Performed by: RADIOLOGY

## 2019-01-22 ASSESSMENT — PAIN SCALES - GENERAL: PAINLEVEL: NO PAIN

## 2019-01-22 NOTE — NON-PROVIDER
"Patient was seen today in clinic with Dr. Tee for Follow up.  Vitals signs and weight were obtained and pain assessment was completed.  Allergies and medications were reviewed with the patient.  Toxicities of treatment assessed.     Vitals/Pain:  Vitals:    01/22/19 0809   BP: (!) 97/64   BP Location: Right arm   Patient Position: Sitting   BP Cuff Size: Adult   Pulse: 71   Temp: 36.5 °C (97.7 °F)   SpO2: 97%   Weight: 71.5 kg (157 lb 11.2 oz)   Height: 1.626 m (5' 4\")   Pain Score: No pain        Allergies:   Patient has no known allergies.    Current Medications:  Current Outpatient Prescriptions   Medication Sig Dispense Refill   • anastrozole (ARIMIDEX) 1 MG Tab Take 1 Tab by mouth every day. 30 Tab 3   • Multiple Vitamins-Minerals (OCUVITE-LUTEIN) Tab Take 1 tablet by mouth every day.     • multivitamin (THERAGRAN) Tab Take 1 Tab by mouth every day.     • vitamin D (CHOLECALCIFEROL) 1000 UNIT Tab Take 1,000 Units by mouth every day.     • Omega-3 Fatty Acids (FISH OIL) 1000 MG Cap capsule Take 1,000 mg by mouth 3 times a day, with meals.     • Lidocaine-Prilocaine (EMLA EX) by Apply externally route.     • pantoprazole (PROTONIX) 40 MG Tablet Delayed Response Take 40 mg by mouth every day.     • acetaminophen (TYLENOL) 325 MG Tab Take 650 mg by mouth every four hours as needed.     • ondansetron (ZOFRAN ODT) 4 MG TABLET DISPERSIBLE Take 4 mg by mouth every 6 hours as needed for Nausea.     • prochlorperazine (COMPAZINE) 10 MG Tab Take 10 mg by mouth every 6 hours as needed.     • dexamethasone (DECADRON) 4 MG Tab Take 4 mg by mouth 2 times a day.     • Naproxen Sodium (ALEVE PO) Take  by mouth.       No current facility-administered medications for this encounter.          PCP:  Anthony Butcher, Med Ass't    "

## 2019-01-22 NOTE — PROGRESS NOTES
RADIATION ONCOLOGY FOLLOW-UP    DATE OF SERVICE: 1/22/2019    IDENTIFICATION:   A 63 y.o. 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  Then presented with a palpable mass underwent a left breast tissue segmental resection and node dissection 4/2/2018 and is now status post [facility] chemotherapy.  Radiation therapy complete 12/11/2018    HISTORY OF PRESENT ILLNESS:   His last seen patient has been doing well she did develop some moist desquamation in the axilla which responded to Silvadene cream.  Now she is complaining of some tightness in the axilla and back in the irradiated area.  She just started anastrozole and is continuing on Herceptin with Dr. Harper    CURRENT MEDICATIONS:  Current Outpatient Prescriptions   Medication Sig Dispense Refill   • anastrozole (ARIMIDEX) 1 MG Tab Take 1 Tab by mouth every day. 30 Tab 3   • Multiple Vitamins-Minerals (OCUVITE-LUTEIN) Tab Take 1 tablet by mouth every day.     • multivitamin (THERAGRAN) Tab Take 1 Tab by mouth every day.     • vitamin D (CHOLECALCIFEROL) 1000 UNIT Tab Take 1,000 Units by mouth every day.     • Omega-3 Fatty Acids (FISH OIL) 1000 MG Cap capsule Take 1,000 mg by mouth 3 times a day, with meals.     • Lidocaine-Prilocaine (EMLA EX) by Apply externally route.     • pantoprazole (PROTONIX) 40 MG Tablet Delayed Response Take 40 mg by mouth every day.     • acetaminophen (TYLENOL) 325 MG Tab Take 650 mg by mouth every four hours as needed.     • ondansetron (ZOFRAN ODT) 4 MG TABLET DISPERSIBLE Take 4 mg by mouth every 6 hours as needed for Nausea.     • prochlorperazine (COMPAZINE) 10 MG Tab Take 10 mg by mouth every 6 hours as needed.     • dexamethasone (DECADRON) 4 MG Tab Take 4 mg by mouth 2 times a day.     • Naproxen Sodium (ALEVE PO) Take  by mouth.    "    No current facility-administered medications for this encounter.        ALLERGIES:  Patient has no known allergies.    FAMILY HISTORY:    Family History   Problem Relation Age of Onset   • Allergies Mother    • Arrythmia Mother    • Hyperlipidemia Mother    • Stroke Mother    • Cancer Mother         breast cancer   • Diabetes Brother    • Prostate cancer Brother    [unfilled]        SOCIAL HISTORY:     reports that she has quit smoking. She has quit using smokeless tobacco. She reports that she does not drink alcohol or use drugs.      REVIEW OF SYSTEMS: Is significant for that mentioned in the HPI  The rest of the review of systems has been reviewed by me and is documented in the nursing note in [facility] dated 10/1/2018    PHYSICAL EXAM:     ECOG PERFORMANCE STATUS:  0= Fully active, able to carry on all pre-disease performance without restriction.       Vitals:    01/22/19 0809   BP: (!) 97/64   BP Location: Right arm   Patient Position: Sitting   BP Cuff Size: Adult   Pulse: 71   Temp: 36.5 °C (97.7 °F)   SpO2: 97%   Weight: 71.5 kg (157 lb 11.2 oz)   Height: 1.626 m (5' 4\")   Pain Score: No pain        GENERAL: Well-appearing alert and oriented x3 in no apparent distress with new hair growth after chemo  Left chest wall: Evidence of the reconstructed breast there is retraction particularly in the axilla associated hyperpigmentation and dry skin consistent with radiation change.  HEENT:  Pupils are equal, round, and reactive to light.  Extraocular muscles   are intact. Sclerae nonicteric.  Conjunctivae pink.  Oral cavity, tongue   protrudes midline.   NECK:  Supple without evidence of thyromegaly.  NODES:  No peripheral adenopathy of the neck, supraclavicular fossa or axillae   bilaterally.  EXTREMITIES:  Without Edema.  NEUROLOGIC:  Cranial nerves II through XII were intact. Normal stance and gait motor and sensory grossly within normal limits          IMPRESSION:    A 63 y.o. female with T2N1a ER39 PR1.1 " HER-2/maricarmen 3+ grade 3 invasive ductal carcinoma in the upper outer quadrant of left reconstructed breast, prior history of DCIS in that breast followed by mastectomy and reconstruction in 2014 with no prior treatment. Then presented with a palpable mass underwent a left breast tissue segmental resection and node dissection 4/2/2018 and is now status post [facility] chemotherapy.  Radiation therapy complete 12/11/2018        RECOMMENDATIONS:   No evidence of disease continuing on anastrozole and Herceptin.  She will be seeing her surgeon in March we will continue to follow her.  She is also going to be seeing Dr. Harper on a regular basis based on national cancer guidelines.  I am happy to see her on an as-needed basis.  I did discuss with her that she may want to consider lymphedema massage because of the retraction in the axilla.  She told me that she will let me know and is happy to see me on an as needed basis        Thank you for the opportunity to participate in her care.  If any questions or comments, please do not hesitate in calling.      Please note that this dictation was created using voice recognition software. I have made every reasonable attempt to correct obvious errors, but I expect that there are errors of grammar and possibly content that I did not discover before finalizing the note.

## 2019-01-23 ENCOUNTER — OUTPATIENT INFUSION SERVICES (OUTPATIENT)
Dept: ONCOLOGY | Facility: MEDICAL CENTER | Age: 64
End: 2019-01-23
Attending: INTERNAL MEDICINE
Payer: COMMERCIAL

## 2019-01-23 ENCOUNTER — OFFICE VISIT (OUTPATIENT)
Dept: HEMATOLOGY ONCOLOGY | Facility: MEDICAL CENTER | Age: 64
End: 2019-01-23
Payer: COMMERCIAL

## 2019-01-23 VITALS
HEIGHT: 64 IN | RESPIRATION RATE: 16 BRPM | SYSTOLIC BLOOD PRESSURE: 100 MMHG | HEART RATE: 89 BPM | WEIGHT: 160.83 LBS | BODY MASS INDEX: 27.46 KG/M2 | DIASTOLIC BLOOD PRESSURE: 62 MMHG | OXYGEN SATURATION: 96 % | TEMPERATURE: 99.5 F

## 2019-01-23 VITALS
SYSTOLIC BLOOD PRESSURE: 106 MMHG | HEIGHT: 64 IN | DIASTOLIC BLOOD PRESSURE: 59 MMHG | BODY MASS INDEX: 27.44 KG/M2 | RESPIRATION RATE: 18 BRPM | HEART RATE: 84 BPM | TEMPERATURE: 98.1 F | OXYGEN SATURATION: 94 % | WEIGHT: 160.72 LBS

## 2019-01-23 DIAGNOSIS — C50.012 MALIGNANT NEOPLASM OF NIPPLE OF LEFT BREAST IN FEMALE, ESTROGEN RECEPTOR POSITIVE (HCC): ICD-10-CM

## 2019-01-23 DIAGNOSIS — Z17.0 MALIGNANT NEOPLASM OF NIPPLE OF LEFT BREAST IN FEMALE, ESTROGEN RECEPTOR POSITIVE (HCC): ICD-10-CM

## 2019-01-23 DIAGNOSIS — Z17.0 MALIGNANT NEOPLASM OF LEFT BREAST IN FEMALE, ESTROGEN RECEPTOR POSITIVE, UNSPECIFIED SITE OF BREAST (HCC): ICD-10-CM

## 2019-01-23 DIAGNOSIS — C50.912 MALIGNANT NEOPLASM OF LEFT BREAST IN FEMALE, ESTROGEN RECEPTOR POSITIVE, UNSPECIFIED SITE OF BREAST (HCC): ICD-10-CM

## 2019-01-23 PROCEDURE — 700111 HCHG RX REV CODE 636 W/ 250 OVERRIDE (IP): Performed by: INTERNAL MEDICINE

## 2019-01-23 PROCEDURE — 700105 HCHG RX REV CODE 258: Performed by: INTERNAL MEDICINE

## 2019-01-23 PROCEDURE — A4212 NON CORING NEEDLE OR STYLET: HCPCS

## 2019-01-23 PROCEDURE — 700111 HCHG RX REV CODE 636 W/ 250 OVERRIDE (IP)

## 2019-01-23 PROCEDURE — 96413 CHEMO IV INFUSION 1 HR: CPT

## 2019-01-23 PROCEDURE — 99213 OFFICE O/P EST LOW 20 MIN: CPT | Performed by: INTERNAL MEDICINE

## 2019-01-23 RX ORDER — 0.9 % SODIUM CHLORIDE 0.9 %
VIAL (ML) INJECTION PRN
Status: CANCELLED | OUTPATIENT
Start: 2019-02-12

## 2019-01-23 RX ORDER — 0.9 % SODIUM CHLORIDE 0.9 %
5 VIAL (ML) INJECTION PRN
Status: CANCELLED | OUTPATIENT
Start: 2019-03-05

## 2019-01-23 RX ORDER — 0.9 % SODIUM CHLORIDE 0.9 %
VIAL (ML) INJECTION PRN
Status: CANCELLED | OUTPATIENT
Start: 2019-03-05

## 2019-01-23 RX ORDER — ONDANSETRON 8 MG/1
8 TABLET, ORALLY DISINTEGRATING ORAL
Status: CANCELLED | OUTPATIENT
Start: 2019-03-05

## 2019-01-23 RX ORDER — PROCHLORPERAZINE MALEATE 10 MG
10 TABLET ORAL EVERY 6 HOURS PRN
Status: CANCELLED | OUTPATIENT
Start: 2019-01-23

## 2019-01-23 RX ORDER — 0.9 % SODIUM CHLORIDE 0.9 %
VIAL (ML) INJECTION PRN
Status: CANCELLED | OUTPATIENT
Start: 2019-01-23

## 2019-01-23 RX ORDER — SODIUM CHLORIDE 9 MG/ML
INJECTION, SOLUTION INTRAVENOUS CONTINUOUS
Status: CANCELLED | OUTPATIENT
Start: 2019-03-05

## 2019-01-23 RX ORDER — ONDANSETRON 8 MG/1
8 TABLET, ORALLY DISINTEGRATING ORAL
Status: CANCELLED | OUTPATIENT
Start: 2019-01-23

## 2019-01-23 RX ORDER — ONDANSETRON 8 MG/1
8 TABLET, ORALLY DISINTEGRATING ORAL
Status: CANCELLED | OUTPATIENT
Start: 2019-02-12

## 2019-01-23 RX ORDER — PROCHLORPERAZINE MALEATE 10 MG
10 TABLET ORAL EVERY 6 HOURS PRN
Status: CANCELLED | OUTPATIENT
Start: 2019-03-05

## 2019-01-23 RX ORDER — LIDOCAINE HYDROCHLORIDE 10 MG/ML
INJECTION, SOLUTION EPIDURAL; INFILTRATION; INTRACAUDAL; PERINEURAL
Status: COMPLETED
Start: 2019-01-23 | End: 2019-01-23

## 2019-01-23 RX ORDER — 0.9 % SODIUM CHLORIDE 0.9 %
5 VIAL (ML) INJECTION PRN
Status: CANCELLED | OUTPATIENT
Start: 2019-01-23

## 2019-01-23 RX ORDER — ONDANSETRON 2 MG/ML
4 INJECTION INTRAMUSCULAR; INTRAVENOUS
Status: CANCELLED | OUTPATIENT
Start: 2019-03-05

## 2019-01-23 RX ORDER — ONDANSETRON 2 MG/ML
4 INJECTION INTRAMUSCULAR; INTRAVENOUS
Status: CANCELLED | OUTPATIENT
Start: 2019-01-23

## 2019-01-23 RX ORDER — 0.9 % SODIUM CHLORIDE 0.9 %
5 VIAL (ML) INJECTION PRN
Status: CANCELLED | OUTPATIENT
Start: 2019-02-12

## 2019-01-23 RX ORDER — SODIUM CHLORIDE 9 MG/ML
INJECTION, SOLUTION INTRAVENOUS CONTINUOUS
Status: CANCELLED | OUTPATIENT
Start: 2019-01-23

## 2019-01-23 RX ORDER — ONDANSETRON 2 MG/ML
4 INJECTION INTRAMUSCULAR; INTRAVENOUS
Status: CANCELLED | OUTPATIENT
Start: 2019-02-12

## 2019-01-23 RX ORDER — SODIUM CHLORIDE 9 MG/ML
INJECTION, SOLUTION INTRAVENOUS CONTINUOUS
Status: CANCELLED | OUTPATIENT
Start: 2019-02-12

## 2019-01-23 RX ORDER — FLUTICASONE PROPIONATE 50 MCG
SPRAY, SUSPENSION (ML) NASAL
COMMUNITY
Start: 2019-01-09

## 2019-01-23 RX ORDER — PROCHLORPERAZINE MALEATE 10 MG
10 TABLET ORAL EVERY 6 HOURS PRN
Status: CANCELLED | OUTPATIENT
Start: 2019-02-12

## 2019-01-23 RX ADMIN — Medication 500 UNITS: at 17:10

## 2019-01-23 RX ADMIN — TRASTUZUMAB 450 MG: 150 INJECTION, POWDER, LYOPHILIZED, FOR SOLUTION INTRAVENOUS at 16:32

## 2019-01-23 RX ADMIN — Medication 0.5 ML: at 15:38

## 2019-01-23 RX ADMIN — LIDOCAINE HYDROCHLORIDE 0.5 ML: 10 INJECTION, SOLUTION EPIDURAL; INFILTRATION; INTRACAUDAL; PERINEURAL at 15:38

## 2019-01-23 ASSESSMENT — PAIN SCALES - GENERAL: PAINLEVEL: NO PAIN

## 2019-01-23 NOTE — PROGRESS NOTES
"Pharmacy Chemotherapy Verification:    DX: Recurrent breast cancer    Cycle: Herceptin maintenance  Previous treatment = 1/02/19    Regimen: Maintenance trastuzumab (Herceptin)  Trastuzumab 8 mg/kg IV loading dose over 90 min on C1D1  Trastuzumab 6 mg/kg IV over 30 min on day 1 beginning Cycle 2  q21 days until DP/UT  NCCN Guidelines for Breast cancer V.1.2018  Barbie JUARES et al - Onkologie. 2010;33(8-9):425-30. doi: 10.1159/162647668. Epub 2010 Jul 27.    Allergies:Patient has no known allergies.  /59   Pulse 84   Temp 36.7 °C (98.1 °F) (Temporal)   Resp 18   Ht 1.635 m (5' 4.37\")   Wt 72.9 kg (160 lb 11.5 oz)   SpO2 94%   BMI 27.27 kg/m²   Body surface area is 1.82 meters squared.     ECHO 10/29/18   LVEF ~ 60%    No labs required.      Trastuzumab (Herceptin) 6 mg/kg  x 72.9 kg = 437.4 mg    Rounded to vial size (within 10%) per dose rounding protocol.    OK to treat with final dose = 450 mg IV      Cammy Ambriz, PharmD, BCOP  "

## 2019-01-23 NOTE — PROGRESS NOTES
Chemotherapy Verification - SECONDARY RN       Height = 163.5 cm  Weight = 72.9 kg  BSA = 1.82 m2       Medication: Herceptin  Dose: 6 mg/kg  Calculated Dose: 437.4 mg                             (In mg/m2, AUC, mg/kg)       I confirm that this process was performed independently.

## 2019-01-23 NOTE — PROGRESS NOTES
Date of visit: 1/23/2019  2:34 PM      Chief Complaint- T2N1a ER 38% SD 1.1% and HER-2/maricarmen 3+ upper outer quadrant of left reconstructed breast, prior history of DCIS         History of presenting illness:     Patient is a 62 yo F with a history of DCIS with L mastectomy and reconstruction in 2014 who   - March 2018, abnormal mammogram   - 4/2/2018 she underwent a left breast tissue segmental resection.  -2.5 cm tumor , 1 mm from the lateral margin 6 mm from the superior and medial margins and greater than 1 cm from other margins. There was high-grade DCIS present at the superior margin and there was 1 out of 1 nodes involved with tumor and it was a 1 cm tumor focus. Additional lateral margin showed no residual tumor present. Tumor was found to be grade 3 there was no extracapsular extension. Estrogen receptor was 38% progesterone perceptively 1.1% and HER-2/maricarmen 3+. Because of the close margin C underwent a reexcision on 4/23/2018 and the superior margin in that resection specimen showed no evidence of residual tumor T2N1a ER 38% SD 1.1% and HER-2/maricarmen 3+.  - TC therapy (docetaxel, carboplatin, trastuzumab) in [place], [place] and completed her 6th cycle on 10/02/18. She will be continuing with Herceptin (trastuzumab) treatment next week here in [place]. . She saw Dr. Tee with plans to start radiation soon.     Patient reported a history of mild bone loss detected by a bone scan about two years ago. She has been in menopause for seven years. Family history is significant for mother diagnosed with breast cancer    Interim history  10/2018-established care here.  She has continued Herceptin    Past Medical History:      Past Medical History:   Diagnosis Date   • Cancer (HCC)     breast cancer left   • GERD (gastroesophageal reflux disease)        Past surgical history:       Past Surgical History:   Procedure Laterality Date   • MASTECTOMY      left 2014   • OTHER      lumpectomy breast tissue (left) 4/2/18 and reexcision  due to positive margin 18   • OTHER         • OTHER      implanted port -right       Allergies:       Patient has no known allergies.    Medications:         Current Outpatient Prescriptions   Medication Sig Dispense Refill   • anastrozole (ARIMIDEX) 1 MG Tab Take 1 Tab by mouth every day. 30 Tab 3   • acetaminophen (TYLENOL) 325 MG Tab Take 650 mg by mouth every four hours as needed.     • Multiple Vitamins-Minerals (OCUVITE-LUTEIN) Tab Take 1 tablet by mouth every day.     • multivitamin (THERAGRAN) Tab Take 1 Tab by mouth every day.     • vitamin D (CHOLECALCIFEROL) 1000 UNIT Tab Take 1,000 Units by mouth every day.     • Omega-3 Fatty Acids (FISH OIL) 1000 MG Cap capsule Take 1,000 mg by mouth 3 times a day, with meals.     • Lidocaine-Prilocaine (EMLA EX) by Apply externally route.     • pantoprazole (PROTONIX) 40 MG Tablet Delayed Response Take 40 mg by mouth every day.     • Naproxen Sodium (ALEVE PO) Take  by mouth.     • ondansetron (ZOFRAN ODT) 4 MG TABLET DISPERSIBLE Take 4 mg by mouth every 6 hours as needed for Nausea.     • prochlorperazine (COMPAZINE) 10 MG Tab Take 10 mg by mouth every 6 hours as needed.     • dexamethasone (DECADRON) 4 MG Tab Take 4 mg by mouth 2 times a day.       No current facility-administered medications for this visit.          Social History:     Social History     Social History   • Marital status:      Spouse name: N/A   • Number of children: N/A   • Years of education: N/A     Occupational History   • Not on file.     Social History Main Topics   • Smoking status: Former Smoker   • Smokeless tobacco: Former User      Comment: quit 18 years ago   • Alcohol use No   • Drug use: No   • Sexual activity: Not on file     Other Topics Concern   • Not on file     Social History Narrative   • No narrative on file       Family History:      Family History   Problem Relation Age of Onset   • Allergies Mother    • Arrythmia Mother    • Hyperlipidemia Mother    •  Stroke Mother    • Cancer Mother         breast cancer   • Diabetes Brother    • Prostate cancer Brother        Review of Systems:  All other review of systems are negative except what was mentioned above in the HPI.    Constitutional: Negative for fever, chills, weight loss and malaise/fatigue.    HEENT: No new auditory or visual complaints. No sore throat and neck pain.     Respiratory: Negative for cough, sputum production, shortness of breath and wheezing.    Cardiovascular: Negative for chest pain, palpitations, orthopnea and leg swelling.    Gastrointestinal: Negative for heartburn, nausea, vomiting and abdominal pain.    Genitourinary: Negative for dysuria, hematuria    Musculoskeletal: No new arthralgias or myalgias   Skin: Negative for rash and itching.    Neurological: Negative for focal weakness and headaches.    Endo/Heme/Allergies: No abnormal bleed/bruise.    Psychiatric/Behavioral: No new depression/anxiety.    Physical Exam:  Vitals: There were no vitals taken for this visit.    General: Not in acute distress, alert and oriented x 3  HEENT: No pallor, icterus. Oropharynx clear.   Neck: Supple, no palpable masses.  Lymph nodes: No palpable cervical, supraclavicular, axillary or inguinal lymphadenopathy.    CVS: regular rate and rhythm, no rubs or gallops  RESP: Clear to auscultate bilaterally, no wheezing or crackles.   ABD: Soft, non tender, non distended, positive bowel sounds, no palpable organomegaly  EXT: No edema or cyanosis  CNS: Alert and oriented x3, No focal deficits.  Skin- No rash      Labs:   No visits with results within 1 Week(s) from this visit.   Latest known visit with results is:   Hospital Outpatient Visit on 12/12/2018   Component Date Value Ref Range Status   • WBC 12/12/2018 7.4  4.8 - 10.8 K/uL Final   • RBC 12/12/2018 3.92* 4.20 - 5.40 M/uL Final   • Hemoglobin 12/12/2018 12.0  12.0 - 16.0 g/dL Final   • Hematocrit 12/12/2018 37.6  37.0 - 47.0 % Final   • MCV 12/12/2018 95.9   81.4 - 97.8 fL Final   • MCH 12/12/2018 30.6  27.0 - 33.0 pg Final   • MCHC 12/12/2018 31.9* 33.6 - 35.0 g/dL Final   • RDW 12/12/2018 47.7  35.9 - 50.0 fL Final   • Platelet Count 12/12/2018 248  164 - 446 K/uL Final   • MPV 12/12/2018 8.9* 9.0 - 12.9 fL Final   • Neutrophils-Polys 12/12/2018 76.30* 44.00 - 72.00 % Final   • Lymphocytes 12/12/2018 13.80* 22.00 - 41.00 % Final   • Monocytes 12/12/2018 7.60  0.00 - 13.40 % Final   • Eosinophils 12/12/2018 1.50  0.00 - 6.90 % Final   • Basophils 12/12/2018 0.50  0.00 - 1.80 % Final   • Immature Granulocytes 12/12/2018 0.30  0.00 - 0.90 % Final   • Nucleated RBC 12/12/2018 0.00  /100 WBC Final   • Neutrophils (Absolute) 12/12/2018 5.66  2.00 - 7.15 K/uL Final    Includes immature neutrophils, if present.   • Lymphs (Absolute) 12/12/2018 1.02  1.00 - 4.80 K/uL Final   • Monos (Absolute) 12/12/2018 0.56  0.00 - 0.85 K/uL Final   • Eos (Absolute) 12/12/2018 0.11  0.00 - 0.51 K/uL Final   • Baso (Absolute) 12/12/2018 0.04  0.00 - 0.12 K/uL Final   • Immature Granulocytes (abs) 12/12/2018 0.02  0.00 - 0.11 K/uL Final   • NRBC (Absolute) 12/12/2018 0.00  K/uL Final   • Sodium 12/12/2018 140  135 - 145 mmol/L Final   • Potassium 12/12/2018 4.0  3.6 - 5.5 mmol/L Final   • Chloride 12/12/2018 108  96 - 112 mmol/L Final   • Co2 12/12/2018 25  20 - 33 mmol/L Final   • Anion Gap 12/12/2018 7.0  0.0 - 11.9 Final   • Glucose 12/12/2018 115* 65 - 99 mg/dL Final   • Bun 12/12/2018 16  8 - 22 mg/dL Final   • Creatinine 12/12/2018 0.69  0.50 - 1.40 mg/dL Final   • Calcium 12/12/2018 9.5  8.5 - 10.5 mg/dL Final   • AST(SGOT) 12/12/2018 13  12 - 45 U/L Final   • ALT(SGPT) 12/12/2018 15  2 - 50 U/L Final   • Alkaline Phosphatase 12/12/2018 62  30 - 99 U/L Final   • Total Bilirubin 12/12/2018 0.4  0.1 - 1.5 mg/dL Final   • Albumin 12/12/2018 4.1  3.2 - 4.9 g/dL Final   • Total Protein 12/12/2018 6.8  6.0 - 8.2 g/dL Final   • Globulin 12/12/2018 2.7  1.9 - 3.5 g/dL Final   • A-G Ratio  12/12/2018 1.5  g/dL Final   • GFR If  12/12/2018 >60  >60 mL/min/1.73 m 2 Final   • GFR If Non  12/12/2018 >60  >60 mL/min/1.73 m 2 Final             Assessment and Plan:      She agreed and verbalized  agreement and understanding with the current plan.  I answered all questions and concerns at this time         Please note that this dictation was created using voice recognition software. I have made every reasonable attempt to correct obvious errors, but I expect that there are errors of grammar and possibly content that I did not discover before finalizing the note.      SIGNATURES:  Cam Harper    CC:  Jose Cruz P.A.-C.  No ref. provider found

## 2019-01-24 NOTE — PROGRESS NOTES
Chemotherapy Verification - PRIMARY RN      Height = 1.635 m  Weight = 72.9 kg  BSA = 1.82 m2       Medication: trastuzumab  Dose: 6 mg/kg  Calculated Dose: 437.4 mg                             (In mg/m2, AUC, mg/kg)       I confirm this process was performed independently with the BSA and all final chemotherapy dosing calculations congruent.  Any discrepancies of 5% or greater have been addressed with the chemotherapy pharmacist. The resolution of the discrepancy has been documented in the EPIC progress notes.

## 2019-01-24 NOTE — PROGRESS NOTES
"Pharmacy Chemotherapy verification    Patient Name: Thao Wan   Dx: Breast cancer HER2 positive - recurrent     Protocol: Maintenance trastuzumab (Herceptin)    *Dosing Reference*  Trastuzumab 8 mg/kg IV over 90 days on Day 1 of Cycle 1 (completed in [place], [place]) followed by  Trastuzumab 6 mg/kg IV over 30 minutes on Day 1 beginning with Cycle 2  21-day cycle until DP or UT  NCCN Guidelines for Breast Cancer V.1.2018  Barbie JUARES, et al - Onkologie. 2010;33(8-9):425-30    Allergies:  Patient has no known allergies.       /59   Pulse 84   Temp 36.7 °C (98.1 °F) (Temporal)   Resp 18   Ht 1.635 m (5' 4.37\")   Wt 72.9 kg (160 lb 11.5 oz)   SpO2 94%   BMI 27.27 kg/m²  Body surface area is 1.82 meters squared.    No labs required    10/29/18 - ECHO LVEF 60%     Drug Order   (Drug name, dose, route, IV Fluid & volume, frequency, number of doses) Cycle: C5 - Herceptin maintenance      Previous treatment: C4 1/2/19   Medication = Trastuzumab  Base Dose= 6 mg/kg  Calc Dose: Base Dose x 72.9kg = 437.4mg  Final Dose = 450 mg  Route = IV  Fluid & Volume =  mL  Admin Duration = Over 30 minutes          <5% difference, okay to treat with final dose     By my signature below, I confirm this process was performed independently with the BSA and all final chemotherapy dosing calculations congruent. I have reviewed the above chemotherapy order and that my calculation of the final dose and BSA (when applicable) corroborate those calculations of the  pharmacist. Discrepancies of 5% or greater in the written dose have been addressed and documented within the [facility] Progress notes.    Amira Corral, PharmD    "

## 2019-01-24 NOTE — PROGRESS NOTES
Pt ambulatory to [facility] w/  for C5 of Herceptin only for breast cancer.  Pt w/ no s/s of infection, pt has no complaints at this time.  Pt saw Dr Harper prior to this appt, pt reports he has ordered a new ECHO for her.  Last ECHO was on 10-29, LVEF of 60%.  Pt presents w/ right side chest PORT, EMLA cream in place, pt requesting lidocaine injection to numb site further.  EMLA cream wiped off, lidocaine injected, PORT accessed in sterile fashion, brisk blood return noted, flushed per protocol.  Herceptin infused w/ no adverse reactions.  Pt PORT w/ brisk blood return post-infusion, flushed per protocol, heparinized and de-accessed, gauze and tegaderm bandage placed.  Pt left on foot in care of  in NAD.  Confirmed pt's next appt.

## 2019-01-24 NOTE — PROCEDURES
Date of visit: 1/23/2019  12:59 PM      Chief Complaint- T2N1a ER 38% DE 1.1% and HER-2/maricarmen 3+ upper outer quadrant of left reconstructed breast, prior history of DCIS         History of presenting illness:     Patient is a 62 yo F with a history of DCIS with L mastectomy and reconstruction in 2014 who   - March 2018, abnormal mammogram   - 4/2/2018 she underwent a left breast tissue segmental resection.  -2.5 cm tumor , 1 mm from the lateral margin 6 mm from the superior and medial margins and greater than 1 cm from other margins. There was high-grade DCIS present at the superior margin and there was 1 out of 1 nodes involved with tumor and it was a 1 cm tumor focus. Additional lateral margin showed no residual tumor present. Tumor was found to be grade 3 there was no extracapsular extension. Estrogen receptor was 38% progesterone perceptively 1.1% and HER-2/maricarmen 3+. Because of the close margin C underwent a reexcision on 4/23/2018 and the superior margin in that resection specimen showed no evidence of residual tumor T2N1a ER 38% DE 1.1% and HER-2/maricarmen 3+.  - [facility] therapy (docetaxel, carboplatin, trastuzumab) in [place], [place] and completed her 6th cycle on 10/02/18. She will be continuing with Herceptin (trastuzumab) treatment next week here in [place]. . She saw Dr. Tee with plans to start radiation soon.     Patient reported a history of mild bone loss detected by a bone scan about two years ago. She has been in menopause for seven years. Family history is significant for mother diagnosed with breast cancer    Interim history  10/2018-established care here.  She has continued Herceptin.  Finished adjuvant radiation.  Started Arimidex, so for good tolerance      Past Medical History:      Past Medical History:   Diagnosis Date   • Cancer (HCC)     breast cancer left   • GERD (gastroesophageal reflux disease)        Past surgical history:       Past Surgical History:   Procedure Laterality Date   • MASTECTOMY       left    • OTHER      lumpectomy breast tissue (left) 18 and reexcision due to positive margin 18   • OTHER         • OTHER      implanted port -right       Allergies:       Patient has no known allergies.    Medications:         Current Outpatient Prescriptions   Medication Sig Dispense Refill   • anastrozole (ARIMIDEX) 1 MG Tab Take 1 Tab by mouth every day. 30 Tab 3   • acetaminophen (TYLENOL) 325 MG Tab Take 650 mg by mouth every four hours as needed.     • Multiple Vitamins-Minerals (OCUVITE-LUTEIN) Tab Take 1 tablet by mouth every day.     • multivitamin (THERAGRAN) Tab Take 1 Tab by mouth every day.     • vitamin D (CHOLECALCIFEROL) 1000 UNIT Tab Take 1,000 Units by mouth every day.     • Omega-3 Fatty Acids (FISH OIL) 1000 MG Cap capsule Take 1,000 mg by mouth 3 times a day, with meals.     • Lidocaine-Prilocaine (EMLA EX) by Apply externally route.     • pantoprazole (PROTONIX) 40 MG Tablet Delayed Response Take 40 mg by mouth every day.     • Naproxen Sodium (ALEVE PO) Take  by mouth.     • fluticasone (FLONASE) 50 MCG/ACT nasal spray      • ondansetron (ZOFRAN ODT) 4 MG TABLET DISPERSIBLE Take 4 mg by mouth every 6 hours as needed for Nausea.     • prochlorperazine (COMPAZINE) 10 MG Tab Take 10 mg by mouth every 6 hours as needed.     • dexamethasone (DECADRON) 4 MG Tab Take 4 mg by mouth 2 times a day.       No current facility-administered medications for this visit.          Social History:     Social History     Social History   • Marital status:      Spouse name: N/A   • Number of children: N/A   • Years of education: N/A     Occupational History   • Not on file.     Social History Main Topics   • Smoking status: Former Smoker   • Smokeless tobacco: Former User      Comment: quit 18 years ago   • Alcohol use No   • Drug use: No   • Sexual activity: Not on file     Other Topics Concern   • Not on file     Social History Narrative   • No narrative on file       Family  "History:      Family History   Problem Relation Age of Onset   • Allergies Mother    • Arrythmia Mother    • Hyperlipidemia Mother    • Stroke Mother    • Cancer Mother         breast cancer   • Diabetes Brother    • Prostate cancer Brother        Review of Systems:  All other review of systems are negative except what was mentioned above in the HPI.    Constitutional: Negative for fever, chills, weight loss and malaise/fatigue.    HEENT: No new auditory or visual complaints. No sore throat and neck pain.     Respiratory: Negative for cough, sputum production, shortness of breath and wheezing.    Cardiovascular: Negative for chest pain, palpitations, orthopnea and leg swelling.    Gastrointestinal: Negative for heartburn, nausea, vomiting and abdominal pain.    Genitourinary: Negative for dysuria, hematuria    Musculoskeletal: No new arthralgias or myalgias   Skin: Negative for rash and itching.    Neurological: Negative for focal weakness and headaches.    Endo/Heme/Allergies: No abnormal bleed/bruise.    Psychiatric/Behavioral: No new depression/anxiety.    Physical Exam:  Vitals: /62 (BP Location: Right arm, Patient Position: Sitting)   Pulse 89   Temp 37.5 °C (99.5 °F) (Temporal)   Resp 16   Ht 1.626 m (5' 4\")   Wt 73 kg (160 lb 13.2 oz)   SpO2 96%   BMI 27.61 kg/m²     General: Not in acute distress, alert and oriented x 3  HEENT: No pallor, icterus. Oropharynx clear.   Neck: Supple, no palpable masses.  Port-A-Cath site looks okay  Lymph nodes: No palpable cervical, supraclavicular, axillary or inguinal lymphadenopathy.    CVS: regular rate and rhythm, no rubs or gallops  RESP: Clear to auscultate bilaterally, no wheezing or crackles.   ABD: Soft, non tender, non distended, positive bowel sounds, no palpable organomegaly  EXT: No edema or cyanosis  CNS: Alert and oriented x3, No focal deficits.  Skin- No rash      Labs:   No visits with results within 1 Week(s) from this visit.   Latest known " visit with results is:   Hospital Outpatient Visit on 12/12/2018   Component Date Value Ref Range Status   • WBC 12/12/2018 7.4  4.8 - 10.8 K/uL Final   • RBC 12/12/2018 3.92* 4.20 - 5.40 M/uL Final   • Hemoglobin 12/12/2018 12.0  12.0 - 16.0 g/dL Final   • Hematocrit 12/12/2018 37.6  37.0 - 47.0 % Final   • MCV 12/12/2018 95.9  81.4 - 97.8 fL Final   • MCH 12/12/2018 30.6  27.0 - 33.0 pg Final   • MCHC 12/12/2018 31.9* 33.6 - 35.0 g/dL Final   • RDW 12/12/2018 47.7  35.9 - 50.0 fL Final   • Platelet Count 12/12/2018 248  164 - 446 K/uL Final   • MPV 12/12/2018 8.9* 9.0 - 12.9 fL Final   • Neutrophils-Polys 12/12/2018 76.30* 44.00 - 72.00 % Final   • Lymphocytes 12/12/2018 13.80* 22.00 - 41.00 % Final   • Monocytes 12/12/2018 7.60  0.00 - 13.40 % Final   • Eosinophils 12/12/2018 1.50  0.00 - 6.90 % Final   • Basophils 12/12/2018 0.50  0.00 - 1.80 % Final   • Immature Granulocytes 12/12/2018 0.30  0.00 - 0.90 % Final   • Nucleated RBC 12/12/2018 0.00  /100 WBC Final   • Neutrophils (Absolute) 12/12/2018 5.66  2.00 - 7.15 K/uL Final    Includes immature neutrophils, if present.   • Lymphs (Absolute) 12/12/2018 1.02  1.00 - 4.80 K/uL Final   • Monos (Absolute) 12/12/2018 0.56  0.00 - 0.85 K/uL Final   • Eos (Absolute) 12/12/2018 0.11  0.00 - 0.51 K/uL Final   • Baso (Absolute) 12/12/2018 0.04  0.00 - 0.12 K/uL Final   • Immature Granulocytes (abs) 12/12/2018 0.02  0.00 - 0.11 K/uL Final   • NRBC (Absolute) 12/12/2018 0.00  K/uL Final   • Sodium 12/12/2018 140  135 - 145 mmol/L Final   • Potassium 12/12/2018 4.0  3.6 - 5.5 mmol/L Final   • Chloride 12/12/2018 108  96 - 112 mmol/L Final   • Co2 12/12/2018 25  20 - 33 mmol/L Final   • Anion Gap 12/12/2018 7.0  0.0 - 11.9 Final   • Glucose 12/12/2018 115* 65 - 99 mg/dL Final   • Bun 12/12/2018 16  8 - 22 mg/dL Final   • Creatinine 12/12/2018 0.69  0.50 - 1.40 mg/dL Final   • Calcium 12/12/2018 9.5  8.5 - 10.5 mg/dL Final   • AST(SGOT) 12/12/2018 13  12 - 45 U/L Final   •  ALT(SGPT) 12/12/2018 15  2 - 50 U/L Final   • Alkaline Phosphatase 12/12/2018 62  30 - 99 U/L Final   • Total Bilirubin 12/12/2018 0.4  0.1 - 1.5 mg/dL Final   • Albumin 12/12/2018 4.1  3.2 - 4.9 g/dL Final   • Total Protein 12/12/2018 6.8  6.0 - 8.2 g/dL Final   • Globulin 12/12/2018 2.7  1.9 - 3.5 g/dL Final   • A-G Ratio 12/12/2018 1.5  g/dL Final   • GFR If  12/12/2018 >60  >60 mL/min/1.73 m 2 Final   • GFR If Non  12/12/2018 >60  >60 mL/min/1.73 m 2 Final             Assessment and Plan:    T2N1a ER 38% ME 1.1% and HER-2/maricarmen 3+ upper outer quadrant of left reconstructed breast . previous history of DCIS with L mastectomy and reconstruction in 2014. She had recurrence in the reconstructed breast tissue and is status post segmentectomy with negative margins.  Finished adjuvant TCH in [place].  Switched to maintenance Herceptin after moving to [place].  Finished adjuvant radiation here and started Arimidex.  So for good tolerance.    She is due for a DEXA scan in April and will keep an eye on it.  I will schedule an echocardiogram in March for cardiac surveillance.  Continue Herceptin every 3 weeks and return to clinic in 9 weeks.  She agreed and verbalized  agreement and understanding with the current plan.  I answered all questions and concerns at this time         Please note that this dictation was created using voice recognition software. I have made every reasonable attempt to correct obvious errors, but I expect that there are errors of grammar and possibly content that I did not discover before finalizing the note.      SIGNATURES:  Cam Harper    CC:  Jose Cruz, P.A.-C.  No ref. provider found

## 2019-01-29 ENCOUNTER — HOSPITAL ENCOUNTER (OUTPATIENT)
Dept: RADIOLOGY | Facility: MEDICAL CENTER | Age: 64
End: 2019-01-29

## 2019-02-13 ENCOUNTER — OUTPATIENT INFUSION SERVICES (OUTPATIENT)
Dept: ONCOLOGY | Facility: MEDICAL CENTER | Age: 64
End: 2019-02-13
Attending: RADIOLOGY
Payer: COMMERCIAL

## 2019-02-13 VITALS
BODY MASS INDEX: 28.04 KG/M2 | WEIGHT: 164.24 LBS | HEART RATE: 67 BPM | RESPIRATION RATE: 18 BRPM | OXYGEN SATURATION: 94 % | TEMPERATURE: 97.8 F | HEIGHT: 64 IN | DIASTOLIC BLOOD PRESSURE: 70 MMHG | SYSTOLIC BLOOD PRESSURE: 114 MMHG

## 2019-02-13 DIAGNOSIS — C50.912 MALIGNANT NEOPLASM OF LEFT BREAST IN FEMALE, ESTROGEN RECEPTOR POSITIVE, UNSPECIFIED SITE OF BREAST (HCC): ICD-10-CM

## 2019-02-13 DIAGNOSIS — Z17.0 MALIGNANT NEOPLASM OF LEFT BREAST IN FEMALE, ESTROGEN RECEPTOR POSITIVE, UNSPECIFIED SITE OF BREAST (HCC): ICD-10-CM

## 2019-02-13 DIAGNOSIS — C50.012 MALIGNANT NEOPLASM OF NIPPLE OF LEFT BREAST IN FEMALE, ESTROGEN RECEPTOR POSITIVE (HCC): ICD-10-CM

## 2019-02-13 DIAGNOSIS — Z17.0 MALIGNANT NEOPLASM OF NIPPLE OF LEFT BREAST IN FEMALE, ESTROGEN RECEPTOR POSITIVE (HCC): ICD-10-CM

## 2019-02-13 PROCEDURE — 700105 HCHG RX REV CODE 258: Performed by: INTERNAL MEDICINE

## 2019-02-13 PROCEDURE — A4212 NON CORING NEEDLE OR STYLET: HCPCS

## 2019-02-13 PROCEDURE — 700111 HCHG RX REV CODE 636 W/ 250 OVERRIDE (IP): Performed by: INTERNAL MEDICINE

## 2019-02-13 PROCEDURE — 700111 HCHG RX REV CODE 636 W/ 250 OVERRIDE (IP)

## 2019-02-13 PROCEDURE — 96413 CHEMO IV INFUSION 1 HR: CPT

## 2019-02-13 RX ORDER — LIDOCAINE HYDROCHLORIDE 10 MG/ML
INJECTION, SOLUTION EPIDURAL; INFILTRATION; INTRACAUDAL; PERINEURAL
Status: COMPLETED
Start: 2019-02-13 | End: 2019-02-13

## 2019-02-13 RX ADMIN — HEPARIN 500 UNITS: 100 SYRINGE at 15:35

## 2019-02-13 RX ADMIN — TRASTUZUMAB 450 MG: 150 INJECTION, POWDER, LYOPHILIZED, FOR SOLUTION INTRAVENOUS at 14:42

## 2019-02-13 RX ADMIN — LIDOCAINE HYDROCHLORIDE 2 ML: 10 INJECTION, SOLUTION EPIDURAL; INFILTRATION; INTRACAUDAL; PERINEURAL at 13:55

## 2019-02-13 NOTE — PROGRESS NOTES
Chemotherapy Verification - PRIMARY RN      Height = 1.626 m  Weight = 74.5 kg  BSA = 1.83 m2   LVEF 60% 10-29-18      Medication: trastuzumab  Dose: 6 mg/kg  Calculated Dose: 447 mg                             (In mg/m2, AUC, mg/kg)       I confirm this process was performed independently with the BSA and all final chemotherapy dosing calculations congruent.  Any discrepancies of 5% or greater have been addressed with the chemotherapy pharmacist. The resolution of the discrepancy has been documented in the EPIC progress notes.

## 2019-02-13 NOTE — PROGRESS NOTES
Pt ambulatory to [facility] w/  for C6 of Herceptin only for breast cancer.  Pt w/ no s/s of infection, pt has no complaints at this time.  Last ECHO was on 10-29, LVEF of 60%, pt has appt for next ECHO on 3-20-19.  Pt presents w/ right side chest PORT, EMLA cream in place, pt requesting lidocaine injection to numb site further.  EMLA cream wiped off, lidocaine injected, PORT accessed in sterile fashion, brisk blood return noted, flushed per protocol.  Herceptin infused w/ no adverse reactions.  Pt PORT w/ brisk blood return post-infusion, flushed per protocol, heparinized and de-accessed, gauze and tegaderm bandage placed.  Pt left on foot in care of  in NAD.  Confirmed pt's next appt.

## 2019-02-13 NOTE — PROGRESS NOTES
"Pharmacy Chemotherapy Verification:    DX: Recurrent breast cancer    Cycle: Herceptin maintenance  Previous treatment = 1/23/19    Regimen: Maintenance trastuzumab (Herceptin)  Trastuzumab 8 mg/kg IV loading dose over 90 min on C1D1  Trastuzumab 6 mg/kg IV over 30 min on day 1 beginning Cycle 2  q21 days until DP/UT  NCCN Guidelines for Breast cancer V.1.2018  Barbie JUARES et al - Onkologie. 2010;33(8-9):425-30. doi: 10.1159/488428808. Epub 2010 Jul 27.    Allergies:Patient has no known allergies.  /70   Pulse 67   Temp 36.6 °C (97.8 °F) (Temporal)   Resp 18   Ht 1.626 m (5' 4\")   Wt 74.5 kg (164 lb 3.9 oz)   SpO2 94%   BMI 28.19 kg/m²   Body surface area is 1.83 meters squared.     ECHO 10/29/18   LVEF ~ 60%    No labs required.      Trastuzumab (Herceptin) 6 mg/kg  x 74.5 kg = 447mg    Rounded to vial size (within 10%) per dose rounding protocol.    OK to treat with final dose = 450 mg IV      Amira Corral, PharmD  "

## 2019-02-13 NOTE — PROGRESS NOTES
Chemotherapy Verification - SECONDARY RN       Height = 162.6 cm  Weight = 74.5 kg  BSA = 1.83 m^2       Medication: Herceptin  Dose: 6 mg/kg  Calculated Dose: 447 mg                             (In mg/m2, AUC, mg/kg)     I confirm that this process was performed independently.

## 2019-02-13 NOTE — PROGRESS NOTES
"Pharmacy Chemotherapy verification    Patient Name: Thao Wan   Dx: Breast cancer HER2 positive - recurrent     Protocol: Maintenance trastuzumab (Herceptin)    *Dosing Reference*  Trastuzumab 8 mg/kg IV over 90 days on Day 1 of Cycle 1 (completed in [place], [place]) followed by  Trastuzumab 6 mg/kg IV over 30 minutes on Day 1 beginning with Cycle 2  21-day cycle until DP or UT  NCCN Guidelines for Breast Cancer V.1.2018  Barbie JUARES, et al - Onkologie. 2010;33(8-9):425-30    Allergies:  Patient has no known allergies.       /70   Pulse 67   Temp 36.6 °C (97.8 °F) (Temporal)   Resp 18   Ht 1.626 m (5' 4\")   Wt 74.5 kg (164 lb 3.9 oz)   SpO2 94%   BMI 28.19 kg/m²  Body surface area is 1.83 meters squared.    No labs required    10/29/18 - ECHO LVEF 60%     Drug Order   (Drug name, dose, route, IV Fluid & volume, frequency, number of doses) Cycle: 6 - Herceptin maintenance      Previous treatment: 1/23/19   Medication = Trastuzumab  Base Dose= 6 mg/kg  Calc Dose: Base Dose x 74.5kg = 447mg  Final Dose = 450 mg  Route = IV  Fluid & Volume =  mL  Admin Duration = Over 30 minutes          <5% difference, okay to treat with final dose     By my signature below, I confirm this process was performed independently with the BSA and all final chemotherapy dosing calculations congruent. I have reviewed the above chemotherapy order and that my calculation of the final dose and BSA (when applicable) corroborate those calculations of the  pharmacist. Discrepancies of 5% or greater in the written dose have been addressed and documented within the [facility] Progress notes.    Cammy Ambriz, PharmD, BCOP    "

## 2019-03-06 ENCOUNTER — HOSPITAL ENCOUNTER (OUTPATIENT)
Dept: CARDIOLOGY | Facility: MEDICAL CENTER | Age: 64
End: 2019-03-06
Attending: INTERNAL MEDICINE
Payer: COMMERCIAL

## 2019-03-06 ENCOUNTER — OUTPATIENT INFUSION SERVICES (OUTPATIENT)
Dept: ONCOLOGY | Facility: MEDICAL CENTER | Age: 64
End: 2019-03-06
Attending: INTERNAL MEDICINE
Payer: COMMERCIAL

## 2019-03-06 VITALS
WEIGHT: 161.82 LBS | BODY MASS INDEX: 27.63 KG/M2 | HEIGHT: 64 IN | HEART RATE: 78 BPM | SYSTOLIC BLOOD PRESSURE: 133 MMHG | RESPIRATION RATE: 18 BRPM | OXYGEN SATURATION: 97 % | DIASTOLIC BLOOD PRESSURE: 63 MMHG

## 2019-03-06 DIAGNOSIS — Z17.0 MALIGNANT NEOPLASM OF LEFT BREAST IN FEMALE, ESTROGEN RECEPTOR POSITIVE, UNSPECIFIED SITE OF BREAST (HCC): ICD-10-CM

## 2019-03-06 DIAGNOSIS — Z17.0 MALIGNANT NEOPLASM OF NIPPLE OF LEFT BREAST IN FEMALE, ESTROGEN RECEPTOR POSITIVE (HCC): ICD-10-CM

## 2019-03-06 DIAGNOSIS — C50.912 MALIGNANT NEOPLASM OF LEFT BREAST IN FEMALE, ESTROGEN RECEPTOR POSITIVE, UNSPECIFIED SITE OF BREAST (HCC): ICD-10-CM

## 2019-03-06 DIAGNOSIS — C50.012 MALIGNANT NEOPLASM OF NIPPLE OF LEFT BREAST IN FEMALE, ESTROGEN RECEPTOR POSITIVE (HCC): ICD-10-CM

## 2019-03-06 LAB
LV EJECT FRACT  99904: 60
LV EJECT FRACT MOD 2C 99903: 66.46
LV EJECT FRACT MOD 4C 99902: 58.6
LV EJECT FRACT MOD BP 99901: 60.99

## 2019-03-06 PROCEDURE — 93308 TTE F-UP OR LMTD: CPT | Mod: 26 | Performed by: INTERNAL MEDICINE

## 2019-03-06 PROCEDURE — 96413 CHEMO IV INFUSION 1 HR: CPT

## 2019-03-06 PROCEDURE — 700111 HCHG RX REV CODE 636 W/ 250 OVERRIDE (IP)

## 2019-03-06 PROCEDURE — A4212 NON CORING NEEDLE OR STYLET: HCPCS

## 2019-03-06 PROCEDURE — 93308 TTE F-UP OR LMTD: CPT

## 2019-03-06 PROCEDURE — 700111 HCHG RX REV CODE 636 W/ 250 OVERRIDE (IP): Performed by: INTERNAL MEDICINE

## 2019-03-06 PROCEDURE — 700105 HCHG RX REV CODE 258: Performed by: INTERNAL MEDICINE

## 2019-03-06 RX ORDER — LIDOCAINE HYDROCHLORIDE 10 MG/ML
INJECTION, SOLUTION EPIDURAL; INFILTRATION; INTRACAUDAL; PERINEURAL
Status: COMPLETED
Start: 2019-03-06 | End: 2019-03-06

## 2019-03-06 RX ADMIN — TRASTUZUMAB 450 MG: 150 INJECTION, POWDER, LYOPHILIZED, FOR SOLUTION INTRAVENOUS at 15:30

## 2019-03-06 RX ADMIN — HEPARIN 500 UNITS: 100 SYRINGE at 16:07

## 2019-03-06 RX ADMIN — LIDOCAINE HYDROCHLORIDE 2 ML: 10 INJECTION, SOLUTION EPIDURAL; INFILTRATION; INTRACAUDAL; PERINEURAL at 15:14

## 2019-03-06 ASSESSMENT — PAIN SCALES - GENERAL: PAINLEVEL: NO PAIN

## 2019-03-06 NOTE — PROGRESS NOTES
"Pharmacy Chemotherapy verification    Patient Name: Thao Wan   Dx: Breast cancer HER2 positive - recurrent     Protocol: Maintenance trastuzumab (Herceptin)    *Dosing Reference*  Trastuzumab 8 mg/kg IV over 90 days on Day 1 of Cycle 1 (completed in [place], [place]) followed by  Trastuzumab 6 mg/kg IV over 30 minutes on Day 1 beginning with Cycle 2  21-day cycle until DP or UT  NCCN Guidelines for Breast Cancer V.1.2018  Barbie JUARES, et al - Onkologie. 2010;33(8-9):425-30    Allergies:  Patient has no known allergies.       /63   Pulse 78   Resp 18   Ht 1.626 m (5' 4\")   Wt 73.4 kg (161 lb 13.1 oz)   SpO2 97%   BMI 27.78 kg/m²  Body surface area is 1.82 meters squared.    No labs required    3/6/19- ECHO LVEF ~ 60%     Drug Order   (Drug name, dose, route, IV Fluid & volume, frequency, number of doses) Cycle: 7 - Herceptin maintenance      Previous treatment: 2/13/19   Medication = Trastuzumab  Base Dose= 6 mg/kg  Calc Dose: Base Dose x 73.4kg = 440mg  Final Dose = 450 mg  Route = IV  Fluid & Volume =  mL  Admin Duration = Over 30 minutes          Rounded to vial size(within 10%) per dose rounding protocol.        By my signature below, I confirm this process was performed independently with the BSA and all final chemotherapy dosing calculations congruent. I have reviewed the above chemotherapy order and that my calculation of the final dose and BSA (when applicable) corroborate those calculations of the  pharmacist. Discrepancies of 5% or greater in the written dose have been addressed and documented within the [facility] Progress notes.    Amira Corral, PharmD  "

## 2019-03-06 NOTE — PROGRESS NOTES
"Pharmacy Chemotherapy Verification:    Dx: Recurrent breast cancer    Cycle: Herceptin maintenance  Previous treatment = 2/13/19    Regimen: Maintenance trastuzumab (Herceptin)  *Dosing Reference*  Trastuzumab 8 mg/kg IV loading dose over 90 min on C1D1  Trastuzumab 6 mg/kg IV over 30 min on day 1 beginning Cycle 2  q21 days until DP/UT  NCCN Guidelines for Breast cancer V.1.2018  Barbie JUARES et al - Onkologie. 2010;33(8-9):425-30. doi: 10.1159/200013936. Epub 2010 Jul 27.    Allergies:Patient has no known allergies.  /63   Pulse 78   Resp 18   Ht 1.626 m (5' 4\")   Wt 73.4 kg (161 lb 13.1 oz)   SpO2 97%   BMI 27.78 kg/m²   Body surface area is 1.82 meters squared.     ECHO 10/29/18 (okay to proceed with tx 3/6/19 using ECHO from 10/29/18 per MD)  LVEF ~ 60%    No labs required.      Trastuzumab (Herceptin) 6 mg/kg  x 73.4 kg = 440 mg    Rounded to vial size (within 10%) per dose rounding protocol.    OK to treat with final dose = 450 mg IV      Rosa Corrales, PharmD  "

## 2019-03-06 NOTE — PROGRESS NOTES
Chemotherapy Verification - PRIMARY RN      Height = 162.6 cm  Weight = 73.4 kg  BSA = 1.82 m^2       Medication: Herceptin  Dose: 6 mg/kg  Calculated Dose: 440.4 mg                             (In mg/m2, AUC, mg/kg)     I confirm this process was performed independently with the BSA and all final chemotherapy dosing calculations congruent.  Any discrepancies of 5% or greater have been addressed with the chemotherapy pharmacist. The resolution of the discrepancy has been documented in the EPIC progress notes.

## 2019-03-06 NOTE — PROGRESS NOTES
Chemotherapy Verification - SECONDARY RN       Height = 162.6 cm  Weight = 73.4 kg  BSA = 1.82 m2       Medication: Herceptin  Dose: 6 mg/kg  Calculated Dose: 440.4 mg                             (In mg/m2, AUC, mg/kg)         I confirm that this process was performed independently.

## 2019-03-07 NOTE — PROGRESS NOTES
Patient here for Herceptin. ECHO completed today. Lidocaine used prior to accessing port. Port accessed using sterile technique; brisk blood return noted. Herceptin given per MAR. Heparin instilled prior to de-accessing port. Port de-accessed; guaze/tape applied over site. Next appointment scheduled. Discharged to self care; no apparent distress noted.

## 2019-03-22 RX ORDER — 0.9 % SODIUM CHLORIDE 0.9 %
5 VIAL (ML) INJECTION PRN
Status: CANCELLED | OUTPATIENT
Start: 2019-03-27

## 2019-03-22 RX ORDER — 0.9 % SODIUM CHLORIDE 0.9 %
VIAL (ML) INJECTION PRN
Status: CANCELLED | OUTPATIENT
Start: 2019-03-27

## 2019-03-22 RX ORDER — ONDANSETRON 2 MG/ML
4 INJECTION INTRAMUSCULAR; INTRAVENOUS
Status: CANCELLED | OUTPATIENT
Start: 2019-03-27

## 2019-03-22 RX ORDER — ONDANSETRON 8 MG/1
8 TABLET, ORALLY DISINTEGRATING ORAL
Status: CANCELLED | OUTPATIENT
Start: 2019-03-27

## 2019-03-22 RX ORDER — SODIUM CHLORIDE 9 MG/ML
INJECTION, SOLUTION INTRAVENOUS CONTINUOUS
Status: CANCELLED | OUTPATIENT
Start: 2019-03-27

## 2019-03-22 RX ORDER — PROCHLORPERAZINE MALEATE 10 MG
10 TABLET ORAL EVERY 6 HOURS PRN
Status: CANCELLED | OUTPATIENT
Start: 2019-03-27

## 2019-03-27 ENCOUNTER — OUTPATIENT INFUSION SERVICES (OUTPATIENT)
Dept: ONCOLOGY | Facility: MEDICAL CENTER | Age: 64
End: 2019-03-27
Attending: INTERNAL MEDICINE
Payer: COMMERCIAL

## 2019-03-27 ENCOUNTER — OFFICE VISIT (OUTPATIENT)
Dept: HEMATOLOGY ONCOLOGY | Facility: MEDICAL CENTER | Age: 64
End: 2019-03-27
Payer: COMMERCIAL

## 2019-03-27 ENCOUNTER — APPOINTMENT (OUTPATIENT)
Dept: HEMATOLOGY ONCOLOGY | Facility: MEDICAL CENTER | Age: 64
End: 2019-03-27
Payer: COMMERCIAL

## 2019-03-27 VITALS
RESPIRATION RATE: 18 BRPM | SYSTOLIC BLOOD PRESSURE: 90 MMHG | HEART RATE: 69 BPM | WEIGHT: 161.6 LBS | BODY MASS INDEX: 27.59 KG/M2 | HEIGHT: 64 IN | TEMPERATURE: 97.5 F | DIASTOLIC BLOOD PRESSURE: 55 MMHG | OXYGEN SATURATION: 95 %

## 2019-03-27 VITALS
WEIGHT: 160.6 LBS | HEART RATE: 60 BPM | RESPIRATION RATE: 16 BRPM | DIASTOLIC BLOOD PRESSURE: 70 MMHG | SYSTOLIC BLOOD PRESSURE: 102 MMHG | TEMPERATURE: 98.2 F | HEIGHT: 64 IN | BODY MASS INDEX: 27.42 KG/M2 | OXYGEN SATURATION: 96 %

## 2019-03-27 VITALS
BODY MASS INDEX: 27.59 KG/M2 | WEIGHT: 161.6 LBS | TEMPERATURE: 97.5 F | OXYGEN SATURATION: 95 % | SYSTOLIC BLOOD PRESSURE: 90 MMHG | DIASTOLIC BLOOD PRESSURE: 55 MMHG | HEART RATE: 69 BPM | HEIGHT: 64 IN | RESPIRATION RATE: 18 BRPM

## 2019-03-27 DIAGNOSIS — Z17.0 MALIGNANT NEOPLASM OF LEFT BREAST IN FEMALE, ESTROGEN RECEPTOR POSITIVE, UNSPECIFIED SITE OF BREAST (HCC): ICD-10-CM

## 2019-03-27 DIAGNOSIS — C50.012 MALIGNANT NEOPLASM OF NIPPLE OF LEFT BREAST IN FEMALE, ESTROGEN RECEPTOR POSITIVE (HCC): ICD-10-CM

## 2019-03-27 DIAGNOSIS — R23.2 HOT FLASHES: ICD-10-CM

## 2019-03-27 DIAGNOSIS — Z17.0 MALIGNANT NEOPLASM OF NIPPLE OF LEFT BREAST IN FEMALE, ESTROGEN RECEPTOR POSITIVE (HCC): ICD-10-CM

## 2019-03-27 DIAGNOSIS — C50.912 MALIGNANT NEOPLASM OF LEFT BREAST IN FEMALE, ESTROGEN RECEPTOR POSITIVE, UNSPECIFIED SITE OF BREAST (HCC): ICD-10-CM

## 2019-03-27 LAB
ALBUMIN SERPL BCP-MCNC: 4.1 G/DL (ref 3.2–4.9)
ALBUMIN/GLOB SERPL: 1.5 G/DL
ALP SERPL-CCNC: 74 U/L (ref 30–99)
ALT SERPL-CCNC: 19 U/L (ref 2–50)
ANION GAP SERPL CALC-SCNC: 6 MMOL/L (ref 0–11.9)
AST SERPL-CCNC: 14 U/L (ref 12–45)
BASOPHILS # BLD AUTO: 1.1 % (ref 0–1.8)
BASOPHILS # BLD: 0.05 K/UL (ref 0–0.12)
BILIRUB SERPL-MCNC: 0.4 MG/DL (ref 0.1–1.5)
BUN SERPL-MCNC: 18 MG/DL (ref 8–22)
CALCIUM SERPL-MCNC: 9.6 MG/DL (ref 8.5–10.5)
CHLORIDE SERPL-SCNC: 108 MMOL/L (ref 96–112)
CO2 SERPL-SCNC: 25 MMOL/L (ref 20–33)
CREAT SERPL-MCNC: 0.73 MG/DL (ref 0.5–1.4)
EOSINOPHIL # BLD AUTO: 0.16 K/UL (ref 0–0.51)
EOSINOPHIL NFR BLD: 3.4 % (ref 0–6.9)
ERYTHROCYTE [DISTWIDTH] IN BLOOD BY AUTOMATED COUNT: 49.6 FL (ref 35.9–50)
GLOBULIN SER CALC-MCNC: 2.7 G/DL (ref 1.9–3.5)
GLUCOSE SERPL-MCNC: 93 MG/DL (ref 65–99)
HCT VFR BLD AUTO: 37.9 % (ref 37–47)
HGB BLD-MCNC: 12.3 G/DL (ref 12–16)
IMM GRANULOCYTES # BLD AUTO: 0.01 K/UL (ref 0–0.11)
IMM GRANULOCYTES NFR BLD AUTO: 0.2 % (ref 0–0.9)
LYMPHOCYTES # BLD AUTO: 1.7 K/UL (ref 1–4.8)
LYMPHOCYTES NFR BLD: 36.2 % (ref 22–41)
MCH RBC QN AUTO: 28.9 PG (ref 27–33)
MCHC RBC AUTO-ENTMCNC: 32.5 G/DL (ref 33.6–35)
MCV RBC AUTO: 89 FL (ref 81.4–97.8)
MONOCYTES # BLD AUTO: 0.46 K/UL (ref 0–0.85)
MONOCYTES NFR BLD AUTO: 9.8 % (ref 0–13.4)
NEUTROPHILS # BLD AUTO: 2.32 K/UL (ref 2–7.15)
NEUTROPHILS NFR BLD: 49.3 % (ref 44–72)
NRBC # BLD AUTO: 0 K/UL
NRBC BLD-RTO: 0 /100 WBC
PLATELET # BLD AUTO: 287 K/UL (ref 164–446)
PMV BLD AUTO: 9.2 FL (ref 9–12.9)
POTASSIUM SERPL-SCNC: 4.1 MMOL/L (ref 3.6–5.5)
PROT SERPL-MCNC: 6.8 G/DL (ref 6–8.2)
RBC # BLD AUTO: 4.26 M/UL (ref 4.2–5.4)
SODIUM SERPL-SCNC: 139 MMOL/L (ref 135–145)
WBC # BLD AUTO: 4.7 K/UL (ref 4.8–10.8)

## 2019-03-27 PROCEDURE — 700111 HCHG RX REV CODE 636 W/ 250 OVERRIDE (IP): Performed by: NURSE PRACTITIONER

## 2019-03-27 PROCEDURE — 80053 COMPREHEN METABOLIC PANEL: CPT

## 2019-03-27 PROCEDURE — A4212 NON CORING NEEDLE OR STYLET: HCPCS

## 2019-03-27 PROCEDURE — 96413 CHEMO IV INFUSION 1 HR: CPT

## 2019-03-27 PROCEDURE — 700111 HCHG RX REV CODE 636 W/ 250 OVERRIDE (IP)

## 2019-03-27 PROCEDURE — 700105 HCHG RX REV CODE 258: Performed by: NURSE PRACTITIONER

## 2019-03-27 PROCEDURE — 85025 COMPLETE CBC W/AUTO DIFF WBC: CPT

## 2019-03-27 PROCEDURE — 36591 DRAW BLOOD OFF VENOUS DEVICE: CPT

## 2019-03-27 PROCEDURE — 99213 OFFICE O/P EST LOW 20 MIN: CPT | Performed by: NURSE PRACTITIONER

## 2019-03-27 RX ORDER — LIDOCAINE HYDROCHLORIDE 10 MG/ML
INJECTION, SOLUTION EPIDURAL; INFILTRATION; INTRACAUDAL; PERINEURAL
Status: COMPLETED
Start: 2019-03-27 | End: 2019-03-27

## 2019-03-27 RX ADMIN — HEPARIN 500 UNITS: 100 SYRINGE at 11:07

## 2019-03-27 RX ADMIN — LIDOCAINE HYDROCHLORIDE 0.5 ML: 10 INJECTION, SOLUTION EPIDURAL; INFILTRATION; INTRACAUDAL; PERINEURAL at 11:00

## 2019-03-27 RX ADMIN — HEPARIN 500 UNITS: 100 SYRINGE at 15:37

## 2019-03-27 RX ADMIN — TRASTUZUMAB 450 MG: 150 INJECTION, POWDER, LYOPHILIZED, FOR SOLUTION INTRAVENOUS at 14:53

## 2019-03-27 ASSESSMENT — PAIN SCALES - GENERAL: PAINLEVEL: NO PAIN

## 2019-03-27 ASSESSMENT — ENCOUNTER SYMPTOMS
CONSTIPATION: 0
MYALGIAS: 0
DIZZINESS: 0
WEIGHT LOSS: 0
SHORTNESS OF BREATH: 0
DIARRHEA: 1
VOMITING: 0
CHILLS: 0
WHEEZING: 0
FEVER: 0
PALPITATIONS: 0
NAUSEA: 0
COUGH: 0
DIAPHORESIS: 1
HEADACHES: 0

## 2019-03-27 NOTE — PROGRESS NOTES
"Pharmacy Chemotherapy Verification:    Dx: Recurrent breast cancer    Cycle: Herceptin maintenance  Previous treatment = 3/6/19    Regimen: Maintenance trastuzumab (Herceptin)  *Dosing Reference*  Trastuzumab 8 mg/kg IV loading dose over 90 min on C1D1  Trastuzumab 6 mg/kg IV over 30 min on day 1 beginning Cycle 2  q21 days until DP/UT  NCCN Guidelines for Breast cancer V.1.2018  Barbie JUARES et al - Onkologie. 2010;33(8-9):425-30. doi: 10.1159/458915925. Epub 2010 Jul 27.    Allergies:Patient has no known allergies.  BP (!) 90/55   Pulse 69   Temp 36.4 °C (97.5 °F)   Resp 18   Ht 1.626 m (5' 4.02\")   Wt 73.3 kg (161 lb 9.6 oz)   SpO2 95%   BMI 27.72 kg/m²   Body surface area is 1.82 meters squared.     ECHO:  10/29/18: LVEF ~ 60%  3/6/19: LVEF ~ 60%    No labs required.      Trastuzumab (Herceptin) 6 mg/kg  x 73.3 kg = 440 mg    Rounded to vial size (within 10%) per dose rounding protocol.    OK to treat with final dose = 450 mg IV      Rosa Corrales, PharmD  "

## 2019-03-27 NOTE — PROGRESS NOTES
Pt to infusion services ambulatory per self.  Pt here for scheduled labs prior to chemotherapy today.  Plan of care reviewed.  Pt verbalizes understanding.  Pt reports no issues or concerns.  Pt with right chest port.  1% lidocaine used to numb port site prior to port access per pt request.  Port site cleansed and port accessed in sterile fashion.  Port flushes easily; +brisk blood return verified.  Labs drawn per MD orders.  Port flushed with normal saline and heparin per policy.  Port remains accessed for treatment later today.  Pt left infusion services in no apparent distress after completion of treatment, ambulatory.  Pt to return at 1415; appointment scheduled.

## 2019-03-27 NOTE — PROGRESS NOTES
"Pharmacy Chemotherapy verification    Patient Name: Thao Wan   Dx: Breast cancer HER2 positive - recurrent     Protocol: Maintenance trastuzumab (Herceptin)    *Dosing Reference*  Trastuzumab 8 mg/kg IV over 90 days on Day 1 of Cycle 1 (completed in [place], [place]) followed by  Trastuzumab 6 mg/kg IV over 30 minutes on Day 1 beginning with Cycle 2  21-day cycle until DP or UT  NCCN Guidelines for Breast Cancer V.1.2018  Barbie JUARES, et al - Onkologie. 2010;33(8-9):425-30    Allergies:  Patient has no known allergies.       BP (!) 90/55   Pulse 69   Temp 36.4 °C (97.5 °F)   Resp 18   Ht 1.626 m (5' 4.02\")   Wt 73.3 kg (161 lb 9.6 oz)   SpO2 95%   BMI 27.72 kg/m²  Body surface area is 1.82 meters squared.    No labs required    3/6/19- ECHO LVEF ~ 60%     Drug Order   (Drug name, dose, route, IV Fluid & volume, frequency, number of doses) Cycle: 8 - Herceptin maintenance      Previous treatment: 3/6/2019   Medication = Trastuzumab  Base Dose= 6 mg/kg  Calc Dose: Base Dose x 73.3 kg = 439.8 mg  Final Dose = 450 mg  Route = IV  Fluid & Volume =  mL  Admin Duration = Over 30 minutes          Rounded to vial size(within 10%) per dose rounding protocol.        By my signature below, I confirm this process was performed independently with the BSA and all final chemotherapy dosing calculations congruent. I have reviewed the above chemotherapy order and that my calculation of the final dose and BSA (when applicable) corroborate those calculations of the  pharmacist. Discrepancies of 5% or greater in the written dose have been addressed and documented within the [facility] Progress notes.    KRISTOPHER Dunham, PharmD  "

## 2019-03-27 NOTE — PROGRESS NOTES
Chemotherapy Verification - PRIMARY RN      Height = 162.6cm  Weight = 73.3kg  BSA = 1.82m2       Medication: Herceptin  Dose: 6mg/kg  Calculated Dose: 439.8mg                             (In mg/m2, AUC, mg/kg)     I confirm this process was performed independently with the BSA and all final chemotherapy dosing calculations congruent.  Any discrepancies of 5% or greater have been addressed with the chemotherapy pharmacist. The resolution of the discrepancy has been documented in the EPIC progress notes.

## 2019-03-27 NOTE — PROGRESS NOTES
Pt returns to infusion center for scheduled Herceptin as treatment for breast cancer.  Port previously accessed, brisk blood return noted.  Labs reviewed from those drawn at prior appointment this morning.  Pt tolerated infusion without incident.  Port flushed with saline and heparin per policy, de-accessed, gauze dressing placed.  Pt left infusion center ambulatory and in good condition.  Return appointment scheduled.

## 2019-03-27 NOTE — PROGRESS NOTES
Subjective:      Thao Wan is a 63 y.o. female who presents with Follow-Up (pre Herceptin (Dr. Harper)) for breast cancer.       HPI    Patient seen today in follow-up for T2N1a ER 38% MS 1.1% and HER-2/maricarmen 3+ upper outer quadrant of left reconstructed breast, prior history of DCIS.  She presents accompanied by her  for today's visit.    Patient did have recurrence of breast cancer in the reconstructed breast tissue and is status post segmentectomy with negative margins.  She did complete adjuvant TCH in [place] and has been switched to maintenance Herceptin after moving here in [place].  She also completed adjuvant radiation therapy here as well as started on Arimidex.    Patient did have an echocardiogram completed on March 6, 2019 which showed an ejection fraction of 60%.  Previous echo was completed in October 29, 2018 which was also at 60%.     Patient is due for a bone density scan in April and is currently scheduled for it on April 15, 2019.    Patient is doing very well and tolerating this treatment well.  Her biggest concern at this time is brittle nails.  Did discuss with patient that this is likely related to previous chemotherapy treatment.  She also does have hot flashes approximately maybe once per day but states they are not too bothersome.  She is tolerating the Arimidex well.  She does have some diarrhea but recently started a diet medication which may be related to that.  Otherwise denies any significant side effects and is tolerating Herceptin very well.    No Known Allergies  Current Outpatient Prescriptions on File Prior to Visit   Medication Sig Dispense Refill   • anastrozole (ARIMIDEX) 1 MG Tab Take 1 Tab by mouth every day. 30 Tab 3   • Multiple Vitamins-Minerals (OCUVITE-LUTEIN) Tab Take 1 tablet by mouth every day.     • multivitamin (THERAGRAN) Tab Take 1 Tab by mouth every day.     • vitamin D (CHOLECALCIFEROL) 1000 UNIT Tab Take 1,000 Units by mouth every day.     •  "pantoprazole (PROTONIX) 40 MG Tablet Delayed Response Take 40 mg by mouth every day.     • fluticasone (FLONASE) 50 MCG/ACT nasal spray      • acetaminophen (TYLENOL) 325 MG Tab Take 650 mg by mouth every four hours as needed.     • Omega-3 Fatty Acids (FISH OIL) 1000 MG Cap capsule Take 1,000 mg by mouth 3 times a day, with meals.     • ondansetron (ZOFRAN ODT) 4 MG TABLET DISPERSIBLE Take 4 mg by mouth every 6 hours as needed for Nausea.     • Lidocaine-Prilocaine (EMLA EX) by Apply externally route.     • prochlorperazine (COMPAZINE) 10 MG Tab Take 10 mg by mouth every 6 hours as needed.     • dexamethasone (DECADRON) 4 MG Tab Take 4 mg by mouth 2 times a day.     • Naproxen Sodium (ALEVE PO) Take  by mouth.       No current facility-administered medications on file prior to visit.        Review of Systems   Constitutional: Positive for diaphoresis (hot flashes still present but not too bothersome). Negative for chills, fever, malaise/fatigue and weight loss.   Respiratory: Negative for cough, shortness of breath and wheezing.    Cardiovascular: Negative for chest pain and palpitations.   Gastrointestinal: Positive for diarrhea. Negative for constipation, nausea and vomiting.   Genitourinary: Negative for dysuria.   Musculoskeletal: Negative for myalgias.   Neurological: Negative for dizziness and headaches.          Objective:     /70 (BP Location: Right arm, Patient Position: Sitting, BP Cuff Size: Adult)   Pulse 60   Temp 36.8 °C (98.2 °F) (Temporal)   Resp 16   Ht 1.626 m (5' 4\")   Wt 72.8 kg (160 lb 9.7 oz)   SpO2 96%   BMI 27.57 kg/m²       Physical Exam   Constitutional: She is oriented to person, place, and time. She appears well-developed and well-nourished. No distress.   HENT:   Head: Normocephalic and atraumatic.   Mouth/Throat: Oropharynx is clear and moist. No oropharyngeal exudate.   Cardiovascular: Normal rate, regular rhythm, normal heart sounds and intact distal pulses.  Exam " reveals no gallop and no friction rub.    No murmur heard.  Pulmonary/Chest: Effort normal and breath sounds normal. No respiratory distress. She has no wheezes.   Abdominal: Soft. Bowel sounds are normal. She exhibits no distension. There is no tenderness.   Musculoskeletal: Normal range of motion. She exhibits no edema or tenderness.   Neurological: She is alert and oriented to person, place, and time.   Skin: Skin is warm and dry. No rash noted. She is not diaphoretic. No erythema. No pallor.   Psychiatric: She has a normal mood and affect. Her behavior is normal.   Vitals reviewed.           Assessment/Plan:     1. Malignant neoplasm of nipple of left breast in female, estrogen receptor positive (HCC)     2. Hot flashes       Plan  1.  Patient with T2N1a ER 38% NE 1.1% and HER-2/maricarmen 3+ upper outer quadrant of left reconstructed breast, prior history of DCIS currently on single agent Herceptin with Arimidex as well.  She is due for cycle #8 of single agent Herceptin following 6 cycles of TCH at an outside facility.  Patient clinically stable and okay to proceed with treatment as planned.    Plan for patient to complete one full year of Herceptin.  She stated she started her chemotherapy at an outside facility in [place] approximately in June.  I discussed with patient that she would likely be completing her last dose of Herceptin on May 29 or June 19, however I will defer to Dr. Harper to reevaluate in date of her Herceptin treatment.    2.  Patient with hot flashes likely related to Arimidex therapy.  She stated the not too bothersome and will continue to monitor.      3. Patient to continue with Herceptin every 3 weeks as planned and will follow-up in the clinic on May 29 with Dr. Harper or sooner if needed.

## 2019-03-27 NOTE — PROGRESS NOTES
Chemotherapy Verification - SECONDARY RN       Height = 64.02 in  Weight = 73.3 kg  BSA = 1.82 m2       Medication: Herceptin  Dose: 6mg/kg  Calculated Dose: 439.8 mg                             (In mg/m2, AUC, mg/kg)     I confirm that this process was performed independently.

## 2019-04-15 ENCOUNTER — HOSPITAL ENCOUNTER (OUTPATIENT)
Dept: RADIOLOGY | Facility: MEDICAL CENTER | Age: 64
End: 2019-04-15
Attending: INTERNAL MEDICINE
Payer: COMMERCIAL

## 2019-04-15 DIAGNOSIS — Z17.0 MALIGNANT NEOPLASM OF LEFT BREAST IN FEMALE, ESTROGEN RECEPTOR POSITIVE, UNSPECIFIED SITE OF BREAST (HCC): ICD-10-CM

## 2019-04-15 DIAGNOSIS — C50.912 MALIGNANT NEOPLASM OF LEFT BREAST IN FEMALE, ESTROGEN RECEPTOR POSITIVE, UNSPECIFIED SITE OF BREAST (HCC): ICD-10-CM

## 2019-04-15 PROCEDURE — 77080 DXA BONE DENSITY AXIAL: CPT

## 2019-04-16 RX ORDER — 0.9 % SODIUM CHLORIDE 0.9 %
VIAL (ML) INJECTION PRN
Status: CANCELLED | OUTPATIENT
Start: 2019-04-17

## 2019-04-16 RX ORDER — ONDANSETRON 2 MG/ML
4 INJECTION INTRAMUSCULAR; INTRAVENOUS
Status: CANCELLED | OUTPATIENT
Start: 2019-04-17

## 2019-04-16 RX ORDER — 0.9 % SODIUM CHLORIDE 0.9 %
5 VIAL (ML) INJECTION PRN
Status: CANCELLED | OUTPATIENT
Start: 2019-04-17

## 2019-04-16 RX ORDER — ONDANSETRON 8 MG/1
8 TABLET, ORALLY DISINTEGRATING ORAL
Status: CANCELLED | OUTPATIENT
Start: 2019-04-17

## 2019-04-16 RX ORDER — SODIUM CHLORIDE 9 MG/ML
INJECTION, SOLUTION INTRAVENOUS CONTINUOUS
Status: CANCELLED | OUTPATIENT
Start: 2019-04-17

## 2019-04-16 RX ORDER — PROCHLORPERAZINE MALEATE 10 MG
10 TABLET ORAL EVERY 6 HOURS PRN
Status: CANCELLED | OUTPATIENT
Start: 2019-04-17

## 2019-04-16 NOTE — PROGRESS NOTES
"Pharmacy Chemotherapy Verification:    Dx: Recurrent breast cancer    Cycle: Herceptin maintenance  Previous treatment = 3/27/19    Regimen: Maintenance trastuzumab (Herceptin)  *Dosing Reference*  Trastuzumab 8 mg/kg IV loading dose over 90 min on C1D1  Trastuzumab 6 mg/kg IV over 30 min on day 1 beginning Cycle 2  q21 days until DP/UT  NCCN Guidelines for Breast cancer V.1.2018  Barbie JUARES et al - Onkologie. 2010;33(8-9):425-30. doi: 10.1159/459719020. Epub 2010 Jul 27.    Allergies:Patient has no known allergies.  BP (!) 92/58   Pulse (!) 57   Temp 36.7 °C (98 °F) (Temporal)   Resp 18   Ht 1.64 m (5' 4.57\")   Wt 73.2 kg (161 lb 6 oz)   SpO2 98%   BMI 27.22 kg/m²   Body surface area is 1.83 meters squared.     ECHO:  3/6/19: LVEF ~ 60%    No labs required.      Trastuzumab (Herceptin) 6 mg/kg  x 73.2kg = 439.2mg    Rounded to vial size (within 10%) per dose rounding protocol.    OK to treat with final dose = 450 mg IV      Amira Corral, PharmD  "

## 2019-04-17 ENCOUNTER — OUTPATIENT INFUSION SERVICES (OUTPATIENT)
Dept: ONCOLOGY | Facility: MEDICAL CENTER | Age: 64
End: 2019-04-17
Attending: INTERNAL MEDICINE
Payer: COMMERCIAL

## 2019-04-17 VITALS
WEIGHT: 161.38 LBS | TEMPERATURE: 98 F | HEIGHT: 65 IN | DIASTOLIC BLOOD PRESSURE: 58 MMHG | RESPIRATION RATE: 18 BRPM | OXYGEN SATURATION: 98 % | SYSTOLIC BLOOD PRESSURE: 92 MMHG | BODY MASS INDEX: 26.89 KG/M2 | HEART RATE: 57 BPM

## 2019-04-17 DIAGNOSIS — Z17.0 MALIGNANT NEOPLASM OF LEFT BREAST IN FEMALE, ESTROGEN RECEPTOR POSITIVE, UNSPECIFIED SITE OF BREAST (HCC): ICD-10-CM

## 2019-04-17 DIAGNOSIS — C50.912 MALIGNANT NEOPLASM OF LEFT BREAST IN FEMALE, ESTROGEN RECEPTOR POSITIVE, UNSPECIFIED SITE OF BREAST (HCC): ICD-10-CM

## 2019-04-17 PROCEDURE — 700111 HCHG RX REV CODE 636 W/ 250 OVERRIDE (IP)

## 2019-04-17 PROCEDURE — A4212 NON CORING NEEDLE OR STYLET: HCPCS

## 2019-04-17 PROCEDURE — 96413 CHEMO IV INFUSION 1 HR: CPT

## 2019-04-17 PROCEDURE — 700111 HCHG RX REV CODE 636 W/ 250 OVERRIDE (IP): Performed by: NURSE PRACTITIONER

## 2019-04-17 PROCEDURE — 700105 HCHG RX REV CODE 258: Performed by: NURSE PRACTITIONER

## 2019-04-17 PROCEDURE — 36593 DECLOT VASCULAR DEVICE: CPT

## 2019-04-17 RX ORDER — LIDOCAINE HYDROCHLORIDE 10 MG/ML
INJECTION, SOLUTION EPIDURAL; INFILTRATION; INTRACAUDAL; PERINEURAL
Status: COMPLETED
Start: 2019-04-17 | End: 2019-04-17

## 2019-04-17 RX ADMIN — HEPARIN 500 UNITS: 100 SYRINGE at 12:55

## 2019-04-17 RX ADMIN — ALTEPLASE 2 MG: 2.2 INJECTION, POWDER, LYOPHILIZED, FOR SOLUTION INTRAVENOUS at 11:05

## 2019-04-17 RX ADMIN — TRASTUZUMAB 450 MG: 150 INJECTION, POWDER, LYOPHILIZED, FOR SOLUTION INTRAVENOUS at 12:10

## 2019-04-17 RX ADMIN — LIDOCAINE HYDROCHLORIDE 2 ML: 10 INJECTION, SOLUTION EPIDURAL; INFILTRATION; INTRACAUDAL; PERINEURAL at 10:00

## 2019-04-17 NOTE — PROGRESS NOTES
Chemotherapy Verification - SECONDARY RN       Height = 64.57 in  Weight = 73.2 kg  BSA = 1.83 m2       Medication: Herceptin  Dose: 6 mg/kg  Calculated Dose: 439.2 mg                             (In mg/m2, AUC, mg/kg)     I confirm that this process was performed independently.

## 2019-04-17 NOTE — PROGRESS NOTES
into Infusions Services for Cycle 9 of Herceptin for Breast Cancer. Pt denied having any new or acute complaints today, reports tolerating past treatments well. Port accessed in a sterile manner, had + blood return after alteplase, flushed briskly. Pt given Chemotherapy as prescribed, tolerated well, denied having any complaints during or after infusion. Port had + blood return after, flushed per [facility] policy, de-accessed, needle intact, insertion site covered with sterile gauze and paper tape. Discharge home with  to self care in NAD. Appointment confirm for next treatment.

## 2019-04-17 NOTE — PROGRESS NOTES
Pharmacy Chemotherapy verification    Patient Name: Thao Wan   Dx: Breast cancer HER2 positive - recurrent     Protocol: Maintenance trastuzumab (Herceptin)    *Dosing Reference*  Trastuzumab 8 mg/kg IV over 90 days on Day 1 of Cycle 1 (completed in [place], [place]) followed by  Trastuzumab 6 mg/kg IV over 30 minutes on Day 1 beginning with Cycle 2  21-day cycle until DP or UT  NCCN Guidelines for Breast Cancer V.1.2018  Barbie JUARES et al - Onkologie. 2010;33(8-9):425-30    Allergies:  Patient has no known allergies.       There were no vitals taken for this visit. There is no height or weight on file to calculate BSA.    No labs required    3/6/19- ECHO LVEF ~ 60%     Drug Order   (Drug name, dose, route, IV Fluid & volume, frequency, number of doses) Cycle: 9 - Herceptin maintenance      Previous treatment: 3/27/2019   Medication = Trastuzumab  Base Dose= 6 mg/kg  Calc Dose: Base Dose x 73.2 kg = 439.2 mg  Final Dose = 450 mg  Route = IV  Fluid & Volume =  mL  Admin Duration = Over 30 minutes          Rounded to vial size(within 10%) per dose rounding protocol.        By my signature below, I confirm this process was performed independently with the BSA and all final chemotherapy dosing calculations congruent. I have reviewed the above chemotherapy order and that my calculation of the final dose and BSA (when applicable) corroborate those calculations of the  pharmacist. Discrepancies of 5% or greater in the written dose have been addressed and documented within the EPIC Progress notes.    KRISTOPHER Dunham, LisaD

## 2019-04-17 NOTE — PROGRESS NOTES
Chemotherapy Verification - PRIMARY RN      Height = 64.57 in  Weight = 161 lb BSA = 1.83 lb       Medication: Herceptin  Dose: 6 mg/kg  Calculated Dose: 439.2 mg / LVEF = 60% on 3/6/2019                            (In mg/m2, AUC, mg/kg)       I confirm this process was performed independently with the BSA and all final chemotherapy dosing calculations congruent.  Any discrepancies of 5% or greater have been addressed with the chemotherapy pharmacist. The resolution of the discrepancy has been documented in the EPIC progress notes.

## 2019-05-03 RX ORDER — PROCHLORPERAZINE MALEATE 10 MG
10 TABLET ORAL EVERY 6 HOURS PRN
Status: CANCELLED | OUTPATIENT
Start: 2019-05-08

## 2019-05-03 RX ORDER — ONDANSETRON 8 MG/1
8 TABLET, ORALLY DISINTEGRATING ORAL
Status: CANCELLED | OUTPATIENT
Start: 2019-05-08

## 2019-05-03 RX ORDER — 0.9 % SODIUM CHLORIDE 0.9 %
VIAL (ML) INJECTION PRN
Status: CANCELLED | OUTPATIENT
Start: 2019-05-08

## 2019-05-03 RX ORDER — SODIUM CHLORIDE 9 MG/ML
INJECTION, SOLUTION INTRAVENOUS CONTINUOUS
Status: CANCELLED | OUTPATIENT
Start: 2019-05-08

## 2019-05-03 RX ORDER — 0.9 % SODIUM CHLORIDE 0.9 %
5 VIAL (ML) INJECTION PRN
Status: CANCELLED | OUTPATIENT
Start: 2019-05-08

## 2019-05-03 RX ORDER — ONDANSETRON 2 MG/ML
4 INJECTION INTRAMUSCULAR; INTRAVENOUS
Status: CANCELLED | OUTPATIENT
Start: 2019-05-08

## 2019-05-08 ENCOUNTER — OUTPATIENT INFUSION SERVICES (OUTPATIENT)
Dept: ONCOLOGY | Facility: MEDICAL CENTER | Age: 64
End: 2019-05-08
Attending: INTERNAL MEDICINE
Payer: COMMERCIAL

## 2019-05-08 VITALS
WEIGHT: 161.82 LBS | SYSTOLIC BLOOD PRESSURE: 110 MMHG | TEMPERATURE: 97.2 F | HEART RATE: 57 BPM | DIASTOLIC BLOOD PRESSURE: 63 MMHG | OXYGEN SATURATION: 100 % | HEIGHT: 65 IN | RESPIRATION RATE: 18 BRPM | BODY MASS INDEX: 26.96 KG/M2

## 2019-05-08 DIAGNOSIS — C50.912 MALIGNANT NEOPLASM OF LEFT BREAST IN FEMALE, ESTROGEN RECEPTOR POSITIVE, UNSPECIFIED SITE OF BREAST (HCC): ICD-10-CM

## 2019-05-08 DIAGNOSIS — Z17.0 MALIGNANT NEOPLASM OF LEFT BREAST IN FEMALE, ESTROGEN RECEPTOR POSITIVE, UNSPECIFIED SITE OF BREAST (HCC): ICD-10-CM

## 2019-05-08 PROCEDURE — 700111 HCHG RX REV CODE 636 W/ 250 OVERRIDE (IP): Performed by: NURSE PRACTITIONER

## 2019-05-08 PROCEDURE — 700111 HCHG RX REV CODE 636 W/ 250 OVERRIDE (IP)

## 2019-05-08 PROCEDURE — 700105 HCHG RX REV CODE 258: Performed by: NURSE PRACTITIONER

## 2019-05-08 RX ORDER — LIDOCAINE HYDROCHLORIDE 10 MG/ML
INJECTION, SOLUTION EPIDURAL; INFILTRATION; INTRACAUDAL; PERINEURAL
Status: COMPLETED
Start: 2019-05-08 | End: 2019-05-08

## 2019-05-08 RX ADMIN — TRASTUZUMAB 450 MG: 150 INJECTION, POWDER, LYOPHILIZED, FOR SOLUTION INTRAVENOUS at 10:22

## 2019-05-08 RX ADMIN — LIDOCAINE HYDROCHLORIDE 0.1 ML: 10 INJECTION, SOLUTION EPIDURAL; INFILTRATION; INTRACAUDAL; PERINEURAL at 10:22

## 2019-05-08 RX ADMIN — Medication 0.1 ML: at 10:22

## 2019-05-08 RX ADMIN — HEPARIN 500 UNITS: 100 SYRINGE at 11:05

## 2019-05-08 NOTE — PROGRESS NOTES
"Pharmacy Chemotherapy verification    Patient Name: Thao Wan   Dx: Breast cancer HER2 positive - recurrent     Protocol: Maintenance trastuzumab (Herceptin)    *Dosing Reference*  Trastuzumab 8 mg/kg IV over 90 days on Day 1 of Cycle 1 (completed in [place], [place]) followed by  Trastuzumab 6 mg/kg IV over 30 minutes on Day 1 beginning with Cycle 2  21-day cycle until DP or UT  NCCN Guidelines for Breast Cancer V.1.2018  Barbie JUARES et al - Onkologie. 2010;33(8-9):425-30    Allergies:  Patient has no known allergies.       /63   Pulse (!) 57   Temp 36.2 °C (97.2 °F) (Temporal)   Resp 18   Ht 1.638 m (5' 4.5\") Comment: 1/4 inch off shoes.  Wt 73.4 kg (161 lb 13.1 oz)   SpO2 100%   BMI 27.35 kg/m²  Body surface area is 1.83 meters squared.    No labs required    3/6/19- ECHO LVEF ~ 60%     Drug Order   (Drug name, dose, route, IV Fluid & volume, frequency, number of doses) Cycle: 10 - Herceptin maintenance      Previous treatment: 4/17/2019   Medication = Trastuzumab  Base Dose= 6 mg/kg  Calc Dose: Base Dose x 73.4 kg = 440.4 mg  Final Dose = 450 mg  Route = IV  Fluid & Volume =  mL  Admin Duration = Over 30 minutes          Rounded to vial size(within 10%) per dose rounding protocol.        By my signature below, I confirm this process was performed independently with the BSA and all final chemotherapy dosing calculations congruent. I have reviewed the above chemotherapy order and that my calculation of the final dose and BSA (when applicable) corroborate those calculations of the  pharmacist. Discrepancies of 5% or greater in the written dose have been addressed and documented within the [facility] Progress notes.    KRISTOPHER Dunham, PharmD  "

## 2019-05-08 NOTE — PROGRESS NOTES
Chemotherapy Verification - SECONDARY RN       Height = 163.8cm  Weight = 73.4kg  BSA = 1.83       Medication: Herceptin  Dose: 6mg/kg  Calculated Dose: 440.4mg                             (In mg/m2, AUC, mg/kg)     I confirm that this process was performed independently.

## 2019-05-08 NOTE — PROGRESS NOTES
"Pharmacy Chemotherapy Verification:    Dx: Recurrent breast cancer    Cycle: Herceptin maintenance  Previous treatment = 4/17/19    Regimen: Maintenance trastuzumab (Herceptin)  *Dosing Reference*  Trastuzumab 8 mg/kg IV loading dose over 90 min on C1D1  Trastuzumab 6 mg/kg IV over 30 min on day 1 beginning Cycle 2  q21 days until DP/UT  NCCN Guidelines for Breast cancer V.1.2018  Barbie JUARES et al - Onkologie. 2010;33(8-9):425-30. doi: 10.1159/820261952. Epub 2010 Jul 27.    Allergies:Patient has no known allergies.  /63   Pulse (!) 57   Temp 36.2 °C (97.2 °F) (Temporal)   Resp 18   Ht 1.638 m (5' 4.5\") Comment: 1/4 inch off shoes.  Wt 73.4 kg (161 lb 13.1 oz)   SpO2 100%   BMI 27.35 kg/m²   Body surface area is 1.83 meters squared.     ECHO:  3/6/19: LVEF ~ 60%    No labs required.      Trastuzumab (Herceptin) 6 mg/kg  x 73.4 kg = 440 mg    Rounded to vial size (within 10%) per dose rounding protocol = 450 mg IV      Rosa Corrales, PharmD  "

## 2019-05-08 NOTE — PROGRESS NOTES
Chemotherapy Verification - PRIMARY RN      Height = 163.8 cm  Weight = 73.4 kg  BSA = 1.83 m2       Medication: Herceptin   Dose: 6 mg/kg  Calculated Dose: 440.4 mg                             (In mg/m2, AUC, mg/kg)       I confirm this process was performed independently with the BSA and all final chemotherapy dosing calculations congruent.  Any discrepancies of 5% or greater have been addressed with the chemotherapy pharmacist. The resolution of the discrepancy has been documented in the EPIC progress notes.

## 2019-05-08 NOTE — PROGRESS NOTES
Returns for Herceptin.  Feels well, reports fatigue lifted.  Port accessed using sterile technique.  Tx infused without issue.  Saline and hep lock flushed post. DC to care of s.o.

## 2019-05-29 ENCOUNTER — OFFICE VISIT (OUTPATIENT)
Dept: HEMATOLOGY ONCOLOGY | Facility: MEDICAL CENTER | Age: 64
End: 2019-05-29
Payer: COMMERCIAL

## 2019-05-29 ENCOUNTER — OUTPATIENT INFUSION SERVICES (OUTPATIENT)
Dept: ONCOLOGY | Facility: MEDICAL CENTER | Age: 64
End: 2019-05-29
Attending: INTERNAL MEDICINE
Payer: COMMERCIAL

## 2019-05-29 VITALS
OXYGEN SATURATION: 91 % | RESPIRATION RATE: 18 BRPM | SYSTOLIC BLOOD PRESSURE: 104 MMHG | TEMPERATURE: 98.1 F | HEART RATE: 72 BPM | HEIGHT: 64 IN | DIASTOLIC BLOOD PRESSURE: 72 MMHG | WEIGHT: 159.39 LBS | BODY MASS INDEX: 27.21 KG/M2

## 2019-05-29 VITALS
TEMPERATURE: 98.6 F | RESPIRATION RATE: 14 BRPM | HEART RATE: 79 BPM | OXYGEN SATURATION: 98 % | BODY MASS INDEX: 27.36 KG/M2 | HEIGHT: 64 IN | SYSTOLIC BLOOD PRESSURE: 114 MMHG | DIASTOLIC BLOOD PRESSURE: 70 MMHG | WEIGHT: 160.27 LBS

## 2019-05-29 VITALS
RESPIRATION RATE: 18 BRPM | DIASTOLIC BLOOD PRESSURE: 54 MMHG | SYSTOLIC BLOOD PRESSURE: 103 MMHG | HEART RATE: 69 BPM | TEMPERATURE: 98.1 F | BODY MASS INDEX: 27.21 KG/M2 | WEIGHT: 159.39 LBS | HEIGHT: 64 IN | OXYGEN SATURATION: 96 %

## 2019-05-29 DIAGNOSIS — Z17.0 MALIGNANT NEOPLASM OF LEFT BREAST IN FEMALE, ESTROGEN RECEPTOR POSITIVE, UNSPECIFIED SITE OF BREAST (HCC): ICD-10-CM

## 2019-05-29 DIAGNOSIS — C50.912 MALIGNANT NEOPLASM OF LEFT BREAST IN FEMALE, ESTROGEN RECEPTOR POSITIVE, UNSPECIFIED SITE OF BREAST (HCC): ICD-10-CM

## 2019-05-29 DIAGNOSIS — C50.012 MALIGNANT NEOPLASM OF NIPPLE OF LEFT BREAST IN FEMALE, ESTROGEN RECEPTOR POSITIVE (HCC): ICD-10-CM

## 2019-05-29 DIAGNOSIS — Z17.0 MALIGNANT NEOPLASM OF NIPPLE OF LEFT BREAST IN FEMALE, ESTROGEN RECEPTOR POSITIVE (HCC): ICD-10-CM

## 2019-05-29 DIAGNOSIS — M81.0 AGE-RELATED OSTEOPOROSIS WITHOUT CURRENT PATHOLOGICAL FRACTURE: ICD-10-CM

## 2019-05-29 LAB
ALBUMIN SERPL BCP-MCNC: 4 G/DL (ref 3.2–4.9)
ALBUMIN/GLOB SERPL: 1.5 G/DL
ALP SERPL-CCNC: 68 U/L (ref 30–99)
ALT SERPL-CCNC: 14 U/L (ref 2–50)
ANION GAP SERPL CALC-SCNC: 8 MMOL/L (ref 0–11.9)
AST SERPL-CCNC: 13 U/L (ref 12–45)
BASOPHILS # BLD AUTO: 1 % (ref 0–1.8)
BASOPHILS # BLD: 0.06 K/UL (ref 0–0.12)
BILIRUB SERPL-MCNC: 0.5 MG/DL (ref 0.1–1.5)
BUN SERPL-MCNC: 9 MG/DL (ref 8–22)
CALCIUM SERPL-MCNC: 9.4 MG/DL (ref 8.5–10.5)
CHLORIDE SERPL-SCNC: 104 MMOL/L (ref 96–112)
CO2 SERPL-SCNC: 24 MMOL/L (ref 20–33)
CREAT SERPL-MCNC: 0.8 MG/DL (ref 0.5–1.4)
EOSINOPHIL # BLD AUTO: 0.16 K/UL (ref 0–0.51)
EOSINOPHIL NFR BLD: 2.6 % (ref 0–6.9)
ERYTHROCYTE [DISTWIDTH] IN BLOOD BY AUTOMATED COUNT: 46.4 FL (ref 35.9–50)
GLOBULIN SER CALC-MCNC: 2.6 G/DL (ref 1.9–3.5)
GLUCOSE SERPL-MCNC: 92 MG/DL (ref 65–99)
HCT VFR BLD AUTO: 38.5 % (ref 37–47)
HGB BLD-MCNC: 12.6 G/DL (ref 12–16)
IMM GRANULOCYTES # BLD AUTO: 0.02 K/UL (ref 0–0.11)
IMM GRANULOCYTES NFR BLD AUTO: 0.3 % (ref 0–0.9)
LYMPHOCYTES # BLD AUTO: 2.05 K/UL (ref 1–4.8)
LYMPHOCYTES NFR BLD: 33.6 % (ref 22–41)
MCH RBC QN AUTO: 29.2 PG (ref 27–33)
MCHC RBC AUTO-ENTMCNC: 32.7 G/DL (ref 33.6–35)
MCV RBC AUTO: 89.3 FL (ref 81.4–97.8)
MONOCYTES # BLD AUTO: 0.54 K/UL (ref 0–0.85)
MONOCYTES NFR BLD AUTO: 8.9 % (ref 0–13.4)
NEUTROPHILS # BLD AUTO: 3.27 K/UL (ref 2–7.15)
NEUTROPHILS NFR BLD: 53.6 % (ref 44–72)
NRBC # BLD AUTO: 0 K/UL
NRBC BLD-RTO: 0 /100 WBC
PLATELET # BLD AUTO: 276 K/UL (ref 164–446)
PMV BLD AUTO: 9.1 FL (ref 9–12.9)
POTASSIUM SERPL-SCNC: 3.7 MMOL/L (ref 3.6–5.5)
PROT SERPL-MCNC: 6.6 G/DL (ref 6–8.2)
RBC # BLD AUTO: 4.31 M/UL (ref 4.2–5.4)
SODIUM SERPL-SCNC: 136 MMOL/L (ref 135–145)
WBC # BLD AUTO: 6.1 K/UL (ref 4.8–10.8)

## 2019-05-29 PROCEDURE — 700111 HCHG RX REV CODE 636 W/ 250 OVERRIDE (IP)

## 2019-05-29 PROCEDURE — 36591 DRAW BLOOD OFF VENOUS DEVICE: CPT

## 2019-05-29 PROCEDURE — 700111 HCHG RX REV CODE 636 W/ 250 OVERRIDE (IP): Performed by: INTERNAL MEDICINE

## 2019-05-29 PROCEDURE — 96413 CHEMO IV INFUSION 1 HR: CPT

## 2019-05-29 PROCEDURE — 85025 COMPLETE CBC W/AUTO DIFF WBC: CPT

## 2019-05-29 PROCEDURE — 99214 OFFICE O/P EST MOD 30 MIN: CPT | Performed by: INTERNAL MEDICINE

## 2019-05-29 PROCEDURE — 80053 COMPREHEN METABOLIC PANEL: CPT

## 2019-05-29 PROCEDURE — A4212 NON CORING NEEDLE OR STYLET: HCPCS

## 2019-05-29 PROCEDURE — 700105 HCHG RX REV CODE 258: Performed by: INTERNAL MEDICINE

## 2019-05-29 RX ORDER — 0.9 % SODIUM CHLORIDE 0.9 %
5 VIAL (ML) INJECTION PRN
Status: CANCELLED | OUTPATIENT
Start: 2019-05-29

## 2019-05-29 RX ORDER — ONDANSETRON 8 MG/1
8 TABLET, ORALLY DISINTEGRATING ORAL
Status: CANCELLED | OUTPATIENT
Start: 2019-05-29

## 2019-05-29 RX ORDER — ONDANSETRON 2 MG/ML
4 INJECTION INTRAMUSCULAR; INTRAVENOUS
Status: CANCELLED | OUTPATIENT
Start: 2019-05-29

## 2019-05-29 RX ORDER — 0.9 % SODIUM CHLORIDE 0.9 %
VIAL (ML) INJECTION PRN
Status: CANCELLED | OUTPATIENT
Start: 2019-05-29

## 2019-05-29 RX ORDER — LIDOCAINE HYDROCHLORIDE 10 MG/ML
INJECTION, SOLUTION EPIDURAL; INFILTRATION; INTRACAUDAL; PERINEURAL
Status: COMPLETED
Start: 2019-05-29 | End: 2019-05-29

## 2019-05-29 RX ORDER — PROCHLORPERAZINE MALEATE 10 MG
10 TABLET ORAL EVERY 6 HOURS PRN
Status: CANCELLED | OUTPATIENT
Start: 2019-05-29

## 2019-05-29 RX ORDER — SODIUM CHLORIDE 9 MG/ML
INJECTION, SOLUTION INTRAVENOUS CONTINUOUS
Status: CANCELLED | OUTPATIENT
Start: 2019-05-29

## 2019-05-29 RX ADMIN — LIDOCAINE HYDROCHLORIDE 2 ML: 10 INJECTION, SOLUTION EPIDURAL; INFILTRATION; INTRACAUDAL; PERINEURAL at 11:30

## 2019-05-29 RX ADMIN — HEPARIN 500 UNITS: 100 SYRINGE at 16:20

## 2019-05-29 RX ADMIN — TRASTUZUMAB 450 MG: 150 INJECTION, POWDER, LYOPHILIZED, FOR SOLUTION INTRAVENOUS at 15:34

## 2019-05-29 RX ADMIN — HEPARIN 500 UNITS: 100 SYRINGE at 11:35

## 2019-05-29 ASSESSMENT — PAIN SCALES - GENERAL: PAINLEVEL: NO PAIN

## 2019-05-29 NOTE — PROGRESS NOTES
Chemotherapy Verification - SECONDARY RN       Height = 63.5 in  Weight = 72.3 kg  BSA = 1.8 m2       Medication: Herceptin  Dose: 6 mg/kg  Calculated Dose: 433.8 mg                             (In mg/m2, AUC, mg/kg)     I confirm that this process was performed independently.

## 2019-05-29 NOTE — PROGRESS NOTES
Chemotherapy Verification - PRIMARY RN      Height = 161.3cm  Weight = 72.3kg  BSA = 1.8m2       Medication: Herceptin  Dose: 6mg/kg  Calculated Dose: 433.8mg                             (In mg/m2, AUC, mg/kg)     I confirm this process was performed independently with the BSA and all final chemotherapy dosing calculations congruent.  Any discrepancies of 10% or greater have been addressed with the chemotherapy pharmacist. The resolution of the discrepancy has been documented in the EPIC progress notes.

## 2019-05-29 NOTE — PROGRESS NOTES
Date of visit: 5/29/2019  1:04 PM      Chief Complaint- T2N1a ER 38% AR 1.1% and HER-2/maricarmen 3+ upper outer quadrant of left reconstructed breast, prior history of DCIS       Identification/Prior relevant history: Thao Wan  is a 63 y.o. year old female who is here for follow-up of breast cancer.    Patient is a 64 yo F with a history of DCIS with L mastectomy and reconstruction in 2014     - March 2018, abnormal mammogram   -4/2/2018 she underwent a left breast tissue segmental resection.  -2.5 cm tumor , 1 mm from the lateral margin 6 mm from the superior and medial margins and greater than 1 cm from other margins. There was high-grade DCIS present at the superior margin and there was 1 out of 1 nodes involved with tumor and it was a 1 cm tumor focus. Additional lateral margin showed no residual tumor present. Tumor was found to be grade 3 there was no extracapsular extension. Estrogen receptor was 38% progesterone perceptively 1.1% and HER-2/maricarmen 3+. Because of the close margin C underwent a reexcision on 4/23/2018 and the superior margin in that resection specimen showed no evidence of residual tumor T2N1a ER 38% AR 1.1% and HER-2/maricarmen 3+.  - TC therapy (docetaxel, carboplatin, trastuzumab) in [place], [place] and completed her 6th cycle on 10/02/18. She will be continuing with Herceptin (trastuzumab) treatment next week here in [place].  She saw Dr. Tee with plans to start radiation soon.     Patient reported a history of mild bone loss detected by a bone scan about two years ago. She has been in menopause for seven years. Family history is significant for mother diagnosed with breast cancer     10/2018-established care here.  She has continued Herceptin.  Finished adjuvant radiation.  Started Arimidex, so far good tolerance.     3/27/2019-tolerating Herceptin well.  Concerned about brittle nails.  Having hot flashes on Arimidex.    Interim history  -5/8/2019 received Herceptin maintenance cycle 10.  Receiving  every 3 weeks.  -4/15/2019 DEXA scan: Shows osteoporosis with high risk of fracture in the lumbar region and osteopenia in the femoral region.  10-year Probability of Fracture:  Major Osteoporotic     10.8%  Hip     1.6%  Population      [place] ()  -Labs 19: CBC and CMP within normal limits.  -Side effects:  Still limited range of motion in left shoulder.  No edema.  Will talk to surgeon about PT.  Having hot flashes 2-3 times a day, improving.  No weight loss.  No new rashes or itching.  No fatigue.  Nails still brittle and splitting.  Taking biotin but not really helping.  Hair coming back.      Past Medical History:      Past Medical History:   Diagnosis Date   • Cancer (HCC)     breast cancer left   • GERD (gastroesophageal reflux disease)        Past surgical history:       Past Surgical History:   Procedure Laterality Date   • MASTECTOMY      left    • OTHER      lumpectomy breast tissue (left) 18 and reexcision due to positive margin 18   • OTHER         • OTHER      implanted port -right       Allergies:       Patient has no known allergies.    Medications:         Current Outpatient Prescriptions   Medication Sig Dispense Refill   • anastrozole (ARIMIDEX) 1 MG Tab TAKE 1 TABLET BY MOUTH EVERY DAY 90 Tab 1   • Biotin 5000 MCG Cap Take  by mouth.     • Multiple Vitamins-Minerals (OCUVITE-LUTEIN) Tab Take 1 tablet by mouth every day.     • multivitamin (THERAGRAN) Tab Take 1 Tab by mouth every day.     • vitamin D (CHOLECALCIFEROL) 1000 UNIT Tab Take 1,000 Units by mouth every day.     • Lidocaine-Prilocaine (EMLA EX) by Apply externally route.     • pantoprazole (PROTONIX) 40 MG Tablet Delayed Response Take 40 mg by mouth every day.     • GREEN COFFEE BEAN PO Take  by mouth.     • GARCINIA CAMBOGIA-CHROMIUM PO Take  by mouth.     • fluticasone (FLONASE) 50 MCG/ACT nasal spray      • acetaminophen (TYLENOL) 325 MG Tab Take 650 mg by mouth every four hours as needed.     •  Omega-3 Fatty Acids (FISH OIL) 1000 MG Cap capsule Take 1,000 mg by mouth 3 times a day, with meals.     • ondansetron (ZOFRAN ODT) 4 MG TABLET DISPERSIBLE Take 4 mg by mouth every 6 hours as needed for Nausea.     • prochlorperazine (COMPAZINE) 10 MG Tab Take 10 mg by mouth every 6 hours as needed.       No current facility-administered medications for this visit.          Social History:     Social History     Social History   • Marital status:      Spouse name: N/A   • Number of children: N/A   • Years of education: N/A     Occupational History   • Not on file.     Social History Main Topics   • Smoking status: Former Smoker   • Smokeless tobacco: Former User      Comment: quit 18 years ago   • Alcohol use No   • Drug use: No   • Sexual activity: Not on file     Other Topics Concern   • Not on file     Social History Narrative   • No narrative on file       Family History:      Family History   Problem Relation Age of Onset   • Allergies Mother    • Arrythmia Mother    • Hyperlipidemia Mother    • Stroke Mother    • Cancer Mother         breast cancer   • Diabetes Brother    • Prostate cancer Brother        Review of Systems:  All other review of systems are negative except what was mentioned above in the HPI.    Constitutional: Negative for fever, chills, weight loss and malaise/fatigue.    Hot flushes 2-3 times a day, improving.  HEENT: No new auditory or visual complaints. No sore throat and neck pain.     Respiratory: Negative for cough, sputum production, shortness of breath and wheezing.    Cardiovascular: Negative for chest pain, palpitations, orthopnea and leg swelling.    Gastrointestinal: Negative for heartburn, nausea, vomiting and abdominal pain.    Genitourinary: Negative for dysuria, hematuria    Musculoskeletal: No new arthralgias or myalgias   Skin: Negative for rash and itching.    Neurological: Negative for focal weakness and headaches.    Endo/Heme/Allergies: No abnormal bleed/bruise.   "  Psychiatric/Behavioral: No new depression/anxiety.    Physical Exam:  Vitals: /70   Pulse 79   Temp 37 °C (98.6 °F)   Resp 14   Ht 1.613 m (5' 3.5\")   Wt 72.7 kg (160 lb 4.4 oz)   SpO2 98%   BMI 27.95 kg/m²     General: Not in acute distress, alert and oriented x 3  HEENT: No pallor, icterus. Oropharynx clear.   Neck: Supple, no palpable masses.  Lymph nodes: No palpable cervical, supraclavicular, axillary or inguinal lymphadenopathy.    CVS: regular rate and rhythm, no rubs or gallops.  Right-sided Port-A-Cath in place without erythema or drainage.  RESP: Clear to auscultate bilaterally, no wheezing or crackles.   ABD: Soft, non tender, non distended, positive bowel sounds, no palpable organomegaly.  EXT: No cyanosis, trace edema in bilateral lower extremities.  CNS: Alert and oriented x3, No focal deficits.  Skin: No rashes.      Labs:   [facility] on 05/29/2019   Component Date Value Ref Range Status   • WBC 05/29/2019 6.1  4.8 - 10.8 K/uL Final   • RBC 05/29/2019 4.31  4.20 - 5.40 M/uL Final   • Hemoglobin 05/29/2019 12.6  12.0 - 16.0 g/dL Final   • Hematocrit 05/29/2019 38.5  37.0 - 47.0 % Final   • MCV 05/29/2019 89.3  81.4 - 97.8 fL Final   • MCH 05/29/2019 29.2  27.0 - 33.0 pg Final   • MCHC 05/29/2019 32.7* 33.6 - 35.0 g/dL Final   • RDW 05/29/2019 46.4  35.9 - 50.0 fL Final   • Platelet Count 05/29/2019 276  164 - 446 K/uL Final   • MPV 05/29/2019 9.1  9.0 - 12.9 fL Final   • Neutrophils-Polys 05/29/2019 53.60  44.00 - 72.00 % Final   • Lymphocytes 05/29/2019 33.60  22.00 - 41.00 % Final   • Monocytes 05/29/2019 8.90  0.00 - 13.40 % Final   • Eosinophils 05/29/2019 2.60  0.00 - 6.90 % Final   • Basophils 05/29/2019 1.00  0.00 - 1.80 % Final   • Immature Granulocytes 05/29/2019 0.30  0.00 - 0.90 % Final   • Nucleated RBC 05/29/2019 0.00  /100 WBC Final   • Neutrophils (Absolute) 05/29/2019 3.27  2.00 - 7.15 K/uL Final    Includes immature neutrophils, if present.   • Lymphs " (Absolute) 05/29/2019 2.05  1.00 - 4.80 K/uL Final   • Monos (Absolute) 05/29/2019 0.54  0.00 - 0.85 K/uL Final   • Eos (Absolute) 05/29/2019 0.16  0.00 - 0.51 K/uL Final   • Baso (Absolute) 05/29/2019 0.06  0.00 - 0.12 K/uL Final   • Immature Granulocytes (abs) 05/29/2019 0.02  0.00 - 0.11 K/uL Final   • NRBC (Absolute) 05/29/2019 0.00  K/uL Final   • Sodium 05/29/2019 136  135 - 145 mmol/L Final   • Potassium 05/29/2019 3.7  3.6 - 5.5 mmol/L Final   • Chloride 05/29/2019 104  96 - 112 mmol/L Final   • Co2 05/29/2019 24  20 - 33 mmol/L Final   • Anion Gap 05/29/2019 8.0  0.0 - 11.9 Final   • Glucose 05/29/2019 92  65 - 99 mg/dL Final   • Bun 05/29/2019 9  8 - 22 mg/dL Final   • Creatinine 05/29/2019 0.80  0.50 - 1.40 mg/dL Final   • Calcium 05/29/2019 9.4  8.5 - 10.5 mg/dL Final   • AST(SGOT) 05/29/2019 13  12 - 45 U/L Final   • ALT(SGPT) 05/29/2019 14  2 - 50 U/L Final   • Alkaline Phosphatase 05/29/2019 68  30 - 99 U/L Final   • Total Bilirubin 05/29/2019 0.5  0.1 - 1.5 mg/dL Final   • Albumin 05/29/2019 4.0  3.2 - 4.9 g/dL Final   • Total Protein 05/29/2019 6.6  6.0 - 8.2 g/dL Final   • Globulin 05/29/2019 2.6  1.9 - 3.5 g/dL Final   • A-G Ratio 05/29/2019 1.5  g/dL Final   • GFR If  05/29/2019 >60  >60 mL/min/1.73 m 2 Final   • GFR If Non  05/29/2019 >60  >60 mL/min/1.73 m 2 Final             Assessment and Plan:    1.  T2N1a ER 38% MA 1.1% and HER-2/maricarmen 3+ upper outer quadrant of left reconstructed breast   -Previous history of DCIS in 2014 status post left mastectomy and reconstruction at that time.    -Abnormal mammogram found in March 2018.  Status post left breast tissue segmental resection in April 2018.    -Completed adjuvant TCH by October 2018 in [facility].  -Maintenance therapy changed to Herceptin after moving to [place].  -After completing adjuvant radiation and renal, patient was started on Arimidex in October 2018.  Having some hot flashes occasionally but so  far good tolerance.    2.  Hot flashes  -Secondary to estrogen deprivation therapy with Arimidex.  -Patient not interested in additional medications for symptoms.    3.  Osteoporosis  -4/15/2019 DEXA scan: Shows osteoporosis with high risk of fracture in the lumbar region and osteopenia in the femoral region.  10-year Probability of Fracture:  Major Osteoporotic     10.8%  Hip     1.6%  Population      [place] ()      Plan:  -Continue Arimidex 1 mg by mouth daily.  Discussed continuing for total of 5 years if continuing to tolerate well.  -Patient will have port removed by her surgeon in [place], [place].  -Patient will have surveillance mammogram performed with her surgeon as well.   -Recommended starting calcium plus vitamin D supplementation due to signs of osteoporosis and osteopenia.  -Recommended starting Prolia versus Reclast for osteoporosis, which may progress while on Arimidex.  -Return to clinic in 6 months.      Patient was seen and discussed with Dr. Harper      She agreed and verbalized  agreement and understanding with the current plan.  I answered all questions and concerns at this time         Please note that this dictation was created using voice recognition software. I have made every reasonable attempt to correct obvious errors, but I expect that there are errors of grammar and possibly content that I did not discover before finalizing the note.      SIGNATURES:  Atul Lock    CC:  CHEMA ParkerC.  No ref. provider found

## 2019-05-29 NOTE — PROGRESS NOTES
"Pharmacy Chemotherapy verification    Patient Name: Thao Wan   Dx: Breast cancer HER2 positive - recurrent     Protocol: Maintenance trastuzumab (Herceptin)    *Dosing Reference*  Trastuzumab 8 mg/kg IV over 90 days on Day 1 of Cycle 1 (completed in [place], [place]) followed by  Trastuzumab 6 mg/kg IV over 30 minutes on Day 1 beginning with Cycle 2  21-day cycle until DP or UT  NCCN Guidelines for Breast Cancer V.1.2018  Barbie JUARES et al - Onkologie. 2010;33(8-9):425-30    Allergies:  Patient has no known allergies.       /54   Pulse 69   Temp 36.7 °C (98.1 °F) (Temporal)   Resp 18   Ht 1.613 m (5' 3.5\") Comment: transfered height from this morning.  Wt 72.3 kg (159 lb 6.3 oz) Comment: transfered weight from this morning.  SpO2 96%   BMI 27.79 kg/m²  Body surface area is 1.8 meters squared.    No labs required    3/6/19- ECHO LVEF ~ 60%     Drug Order   (Drug name, dose, route, IV Fluid & volume, frequency, number of doses) Cycle: 11 - Herceptin maintenance      Previous treatment: 5/8/19   Medication = Trastuzumab  Base Dose= 6 mg/kg  Calc Dose: Base Dose x 72.3kg = 434mg  Final Dose = 450 mg  Route = IV  Fluid & Volume =  mL  Admin Duration = Over 30 minutes          Rounded to vial size(within 10%) per dose rounding protocol.        By my signature below, I confirm this process was performed independently with the BSA and all final chemotherapy dosing calculations congruent. I have reviewed the above chemotherapy order and that my calculation of the final dose and BSA (when applicable) corroborate those calculations of the  pharmacist. Discrepancies of 5% or greater in the written dose have been addressed and documented within the [facility] Progress notes.    Amira Corral, LisaD  "

## 2019-05-29 NOTE — PROGRESS NOTES
Patient presents for lab draw prior to chemotherapy later today. Port accessed using sterile technique, blood drawn as ordered. Port flushed per protocol and left accessed. Patient discharged in no acute distress.

## 2019-05-29 NOTE — PROGRESS NOTES
"Pharmacy Chemotherapy Verification  Patient Name: Thao Wan  Dx: Recurrent breast cancer    Cycle: Herceptin maintenance  Previous treatment = 5/8/19    Regimen: Maintenance trastuzumab (Herceptin)  *Dosing Reference*  Trastuzumab 8 mg/kg IV loading dose over 90 min on C1D1  Trastuzumab 6 mg/kg IV over 30 min on day 1 beginning Cycle 2  q21 days until DP/UT  NCCN Guidelines for Breast cancer V.1.2018  Barbie JUARES, et al - Onkologie. 2010;33(8-9):425-30. doi: 10.1159/513711184. Epub 2010 Jul 27.    Allergies:Patient has no known allergies.  /54   Pulse 69   Temp 36.7 °C (98.1 °F) (Temporal)   Resp 18   Ht 1.613 m (5' 3.5\") Comment: transfered height from this morning.  Wt 72.3 kg (159 lb 6.3 oz) Comment: transfered weight from this morning.  SpO2 96%   BMI 27.79 kg/m²   Body surface area is 1.8 meters squared.     ECHO  3/6/19: LVEF ~ 60%    No labs required.    Trastuzumab (Herceptin) 6 mg/kg  x 72.3 kg = 433.8 mg    Rounded to vial size (within 10%) per dose rounding protocol = 450 mg IV    Chapis Blevins, PharmD, BCOP    "

## 2019-05-29 NOTE — LETTER
[facility]   [address] [facility] [address], [address]  [place] [address]  Phone: [phone]  Fax: [phone]              Thao Wan  1955    Encounter Date: 5/29/2019    Cam Harper M.D.          PROGRESS NOTE:  Date of visit: 5/29/2019  1:04 PM      Chief Complaint- T2N1a ER 38% IA 1.1% and HER-2/maricarmen 3+ upper outer quadrant of left reconstructed breast, prior history of DCIS       Identification/Prior relevant history: Thao Wan  is a 63 y.o. year old female who is here for follow-up of breast cancer.    Patient is a 64 yo F with a history of DCIS with L mastectomy and reconstruction in 2014     - March 2018, abnormal mammogram   -4/2/2018 she underwent a left breast tissue segmental resection.  -2.5 cm tumor , 1 mm from the lateral margin 6 mm from the superior and medial margins and greater than 1 cm from other margins. There was high-grade DCIS present at the superior margin and there was 1 out of 1 nodes involved with tumor and it was a 1 cm tumor focus. Additional lateral margin showed no residual tumor present. Tumor was found to be grade 3 there was no extracapsular extension. Estrogen receptor was 38% progesterone perceptively 1.1% and HER-2/maricarmen 3+. Because of the close margin C underwent a reexcision on 4/23/2018 and the superior margin in that resection specimen showed no evidence of residual tumor T2N1a ER 38% IA 1.1% and HER-2/maricarmen 3+.  - [facility] therapy (docetaxel, carboplatin, trastuzumab) in [place], [place] and completed her 6th cycle on 10/02/18. She will be continuing with Herceptin (trastuzumab) treatment next week here in [place].  She saw Dr. Tee with plans to start radiation soon.     Patient reported a history of mild bone loss detected by a bone scan about two years ago. She has been in menopause for seven years. Family history is significant for mother diagnosed with breast cancer     10/2018-established care here.  She has continued Herceptin.  Finished adjuvant  radiation.  Started Arimidex, so far good tolerance.     3/27/2019-tolerating Herceptin well.  Concerned about brittle nails.  Having hot flashes on Arimidex.    Interim history  -2019 received Herceptin maintenance cycle 10.  Receiving every 3 weeks.  -4/15/2019 DEXA scan: Shows osteoporosis with high risk of fracture in the lumbar region and osteopenia in the femoral region.  10-year Probability of Fracture:  Major Osteoporotic     10.8%  Hip     1.6%  Population      [place] ()  -Labs 19: CBC and CMP within normal limits.  -Side effects:  Still limited range of motion in left shoulder.  No edema.  Will talk to surgeon about PT.  Having hot flashes 2-3 times a day, improving.  No weight loss.  No new rashes or itching.  No fatigue.  Nails still brittle and splitting.  Taking biotin but not really helping.  Hair coming back.      Past Medical History:      Past Medical History:   Diagnosis Date   • Cancer (HCC)     breast cancer left   • GERD (gastroesophageal reflux disease)        Past surgical history:       Past Surgical History:   Procedure Laterality Date   • MASTECTOMY      left    • OTHER      lumpectomy breast tissue (left) 18 and reexcision due to positive margin 18   • OTHER         • OTHER      implanted port -right       Allergies:       Patient has no known allergies.    Medications:         Current Outpatient Prescriptions   Medication Sig Dispense Refill   • anastrozole (ARIMIDEX) 1 MG Tab TAKE 1 TABLET BY MOUTH EVERY DAY 90 Tab 1   • Biotin 5000 MCG Cap Take  by mouth.     • Multiple Vitamins-Minerals (OCUVITE-LUTEIN) Tab Take 1 tablet by mouth every day.     • multivitamin (THERAGRAN) Tab Take 1 Tab by mouth every day.     • vitamin D (CHOLECALCIFEROL) 1000 UNIT Tab Take 1,000 Units by mouth every day.     • Lidocaine-Prilocaine (EMLA EX) by Apply externally route.     • pantoprazole (PROTONIX) 40 MG Tablet Delayed Response Take 40 mg by mouth every day.     •  GREEN COFFEE BEAN PO Take  by mouth.     • GARCINIA CAMBOGIA-CHROMIUM PO Take  by mouth.     • fluticasone (FLONASE) 50 MCG/ACT nasal spray      • acetaminophen (TYLENOL) 325 MG Tab Take 650 mg by mouth every four hours as needed.     • Omega-3 Fatty Acids (FISH OIL) 1000 MG Cap capsule Take 1,000 mg by mouth 3 times a day, with meals.     • ondansetron (ZOFRAN ODT) 4 MG TABLET DISPERSIBLE Take 4 mg by mouth every 6 hours as needed for Nausea.     • prochlorperazine (COMPAZINE) 10 MG Tab Take 10 mg by mouth every 6 hours as needed.       No current facility-administered medications for this visit.          Social History:     Social History     Social History   • Marital status:      Spouse name: N/A   • Number of children: N/A   • Years of education: N/A     Occupational History   • Not on file.     Social History Main Topics   • Smoking status: Former Smoker   • Smokeless tobacco: Former User      Comment: quit 18 years ago   • Alcohol use No   • Drug use: No   • Sexual activity: Not on file     Other Topics Concern   • Not on file     Social History Narrative   • No narrative on file       Family History:      Family History   Problem Relation Age of Onset   • Allergies Mother    • Arrythmia Mother    • Hyperlipidemia Mother    • Stroke Mother    • Cancer Mother         breast cancer   • Diabetes Brother    • Prostate cancer Brother        Review of Systems:  All other review of systems are negative except what was mentioned above in the HPI.    Constitutional: Negative for fever, chills, weight loss and malaise/fatigue.    Hot flushes 2-3 times a day, improving.  HEENT: No new auditory or visual complaints. No sore throat and neck pain.     Respiratory: Negative for cough, sputum production, shortness of breath and wheezing.    Cardiovascular: Negative for chest pain, palpitations, orthopnea and leg swelling.    Gastrointestinal: Negative for heartburn, nausea, vomiting and abdominal pain.     "  Genitourinary: Negative for dysuria, hematuria    Musculoskeletal: No new arthralgias or myalgias   Skin: Negative for rash and itching.    Neurological: Negative for focal weakness and headaches.    Endo/Heme/Allergies: No abnormal bleed/bruise.    Psychiatric/Behavioral: No new depression/anxiety.    Physical Exam:  Vitals: /70   Pulse 79   Temp 37 °C (98.6 °F)   Resp 14   Ht 1.613 m (5' 3.5\")   Wt 72.7 kg (160 lb 4.4 oz)   SpO2 98%   BMI 27.95 kg/m²      General: Not in acute distress, alert and oriented x 3  HEENT: No pallor, icterus. Oropharynx clear.   Neck: Supple, no palpable masses.  Lymph nodes: No palpable cervical, supraclavicular, axillary or inguinal lymphadenopathy.    CVS: regular rate and rhythm, no rubs or gallops.  Right-sided Port-A-Cath in place without erythema or drainage.  RESP: Clear to auscultate bilaterally, no wheezing or crackles.   ABD: Soft, non tender, non distended, positive bowel sounds, no palpable organomegaly.  EXT: No cyanosis, trace edema in bilateral lower extremities.  CNS: Alert and oriented x3, No focal deficits.  Skin: No rashes.      Labs:   Outpatient Infusion Services on 05/29/2019   Component Date Value Ref Range Status   • WBC 05/29/2019 6.1  4.8 - 10.8 K/uL Final   • RBC 05/29/2019 4.31  4.20 - 5.40 M/uL Final   • Hemoglobin 05/29/2019 12.6  12.0 - 16.0 g/dL Final   • Hematocrit 05/29/2019 38.5  37.0 - 47.0 % Final   • MCV 05/29/2019 89.3  81.4 - 97.8 fL Final   • MCH 05/29/2019 29.2  27.0 - 33.0 pg Final   • MCHC 05/29/2019 32.7* 33.6 - 35.0 g/dL Final   • RDW 05/29/2019 46.4  35.9 - 50.0 fL Final   • Platelet Count 05/29/2019 276  164 - 446 K/uL Final   • MPV 05/29/2019 9.1  9.0 - 12.9 fL Final   • Neutrophils-Polys 05/29/2019 53.60  44.00 - 72.00 % Final   • Lymphocytes 05/29/2019 33.60  22.00 - 41.00 % Final   • Monocytes 05/29/2019 8.90  0.00 - 13.40 % Final   • Eosinophils 05/29/2019 2.60  0.00 - 6.90 % Final   • Basophils 05/29/2019 1.00  0.00 - " 1.80 % Final   • Immature Granulocytes 05/29/2019 0.30  0.00 - 0.90 % Final   • Nucleated RBC 05/29/2019 0.00  /100 WBC Final   • Neutrophils (Absolute) 05/29/2019 3.27  2.00 - 7.15 K/uL Final    Includes immature neutrophils, if present.   • Lymphs (Absolute) 05/29/2019 2.05  1.00 - 4.80 K/uL Final   • Monos (Absolute) 05/29/2019 0.54  0.00 - 0.85 K/uL Final   • Eos (Absolute) 05/29/2019 0.16  0.00 - 0.51 K/uL Final   • Baso (Absolute) 05/29/2019 0.06  0.00 - 0.12 K/uL Final   • Immature Granulocytes (abs) 05/29/2019 0.02  0.00 - 0.11 K/uL Final   • NRBC (Absolute) 05/29/2019 0.00  K/uL Final   • Sodium 05/29/2019 136  135 - 145 mmol/L Final   • Potassium 05/29/2019 3.7  3.6 - 5.5 mmol/L Final   • Chloride 05/29/2019 104  96 - 112 mmol/L Final   • Co2 05/29/2019 24  20 - 33 mmol/L Final   • Anion Gap 05/29/2019 8.0  0.0 - 11.9 Final   • Glucose 05/29/2019 92  65 - 99 mg/dL Final   • Bun 05/29/2019 9  8 - 22 mg/dL Final   • Creatinine 05/29/2019 0.80  0.50 - 1.40 mg/dL Final   • Calcium 05/29/2019 9.4  8.5 - 10.5 mg/dL Final   • AST(SGOT) 05/29/2019 13  12 - 45 U/L Final   • ALT(SGPT) 05/29/2019 14  2 - 50 U/L Final   • Alkaline Phosphatase 05/29/2019 68  30 - 99 U/L Final   • Total Bilirubin 05/29/2019 0.5  0.1 - 1.5 mg/dL Final   • Albumin 05/29/2019 4.0  3.2 - 4.9 g/dL Final   • Total Protein 05/29/2019 6.6  6.0 - 8.2 g/dL Final   • Globulin 05/29/2019 2.6  1.9 - 3.5 g/dL Final   • A-G Ratio 05/29/2019 1.5  g/dL Final   • GFR If  05/29/2019 >60  >60 mL/min/1.73 m 2 Final   • GFR If Non  05/29/2019 >60  >60 mL/min/1.73 m 2 Final             Assessment and Plan:    1.  T2N1a ER 38% OK 1.1% and HER-2/maricarmen 3+ upper outer quadrant of left reconstructed breast   -Previous history of DCIS in 2014 status post left mastectomy and reconstruction at that time.    -Abnormal mammogram found in March 2018.  Status post left breast tissue segmental resection in April 2018.    -Completed adjuvant  [facility] by October 2018 in [facility].  -Maintenance therapy changed to Herceptin after moving to [place].  -After completing adjuvant radiation and renal, patient was started on Arimidex in October 2018.  Having some hot flashes occasionally but so far good tolerance.    2.  Hot flashes  -Secondary to estrogen deprivation therapy with Arimidex.  -Patient not interested in additional medications for symptoms.    3.  Osteoporosis  -4/15/2019 DEXA scan: Shows osteoporosis with high risk of fracture in the lumbar region and osteopenia in the femoral region.  10-year Probability of Fracture:  Major Osteoporotic     10.8%  Hip     1.6%  Population      [place] ()      Plan:  -Continue Arimidex 1 mg by mouth daily.  Discussed continuing for total of 5 years if continuing to tolerate well.  -Patient will have port removed by her surgeon in [place], [place].  -Patient will have surveillance mammogram performed with her surgeon as well.   -Recommended starting calcium plus vitamin D supplementation due to signs of osteoporosis and osteopenia.  -Recommended starting Prolia versus Reclast for osteoporosis, which may progress while on Arimidex.  -Return to clinic in 6 months.      Patient was seen and discussed with Dr. Harper      She agreed and verbalized  agreement and understanding with the current plan.  I answered all questions and concerns at this time         Please note that this dictation was created using voice recognition software. I have made every reasonable attempt to correct obvious errors, but I expect that there are errors of grammar and possibly content that I did not discover before finalizing the note.      SIGNATURES:  Atul Lock    CC:  Jose Cruz P.A.-C.  No ref. provider found        No Recipients

## 2019-05-30 PROBLEM — M81.0 AGE-RELATED OSTEOPOROSIS WITHOUT CURRENT PATHOLOGICAL FRACTURE: Status: ACTIVE | Noted: 2019-05-30

## 2019-05-30 NOTE — PROGRESS NOTES
Pt returns to infusion center for last dose of Herceptin.  Port remains accessed from earlier appointment; brisk blood return noted.  Pt tolerated infusion without incident.  Port flushed with saline and heparin per policy, de-accessed, gauze dressing placed.  Pt left infusion center ambulatory and in good condition.  Further follow up to be determined by MD's office.

## 2019-08-29 NOTE — PROGRESS NOTES
Date of visit: 1/23/2019  12:59 PM        Chief Complaint- T2N1a ER 38% NE 1.1% and HER-2/maricarmen 3+ upper outer quadrant of left reconstructed breast, prior history of DCIS         History of presenting illness:     Patient is a 62 yo F with a history of DCIS with L mastectomy and reconstruction in 2014 who   - March 2018, abnormal mammogram   - 4/2/2018 she underwent a left breast tissue segmental resection.  -2.5 cm tumor , 1 mm from the lateral margin 6 mm from the superior and medial margins and greater than 1 cm from other margins. There was high-grade DCIS present at the superior margin and there was 1 out of 1 nodes involved with tumor and it was a 1 cm tumor focus. Additional lateral margin showed no residual tumor present. Tumor was found to be grade 3 there was no extracapsular extension. Estrogen receptor was 38% progesterone perceptively 1.1% and HER-2/maricarmen 3+. Because of the close margin C underwent a reexcision on 4/23/2018 and the superior margin in that resection specimen showed no evidence of residual tumor T2N1a ER 38% NE 1.1% and HER-2/maricarmen 3+.  - [facility] therapy (docetaxel, carboplatin, trastuzumab) in [place], [place] and completed her 6th cycle on 10/02/18. She will be continuing with Herceptin (trastuzumab) treatment next week here in [place]. . She saw Dr. Tee with plans to start radiation soon.     Patient reported a history of mild bone loss detected by a bone scan about two years ago. She has been in menopause for seven years. Family history is significant for mother diagnosed with breast cancer     Interim history  10/2018-established care here.  She has continued Herceptin.  Finished adjuvant radiation.  Started Arimidex, so for good tolerance        Past Medical History:      Past Medical History        Past Medical History:   Diagnosis Date   • Cancer (HCC)       breast cancer left   • GERD (gastroesophageal reflux disease)              Past surgical history:       Past Surgical History   Past  Spoke with patient - put in recall for Colonoscopy 12/2021 - Patient will rec letter    Surgical History:   Procedure Laterality Date   • MASTECTOMY         left    • OTHER         lumpectomy breast tissue (left) 18 and reexcision due to positive margin 18   • OTHER            • OTHER         implanted port -right            Allergies:       Patient has no known allergies.     Medications:         Current Medications          Current Outpatient Prescriptions   Medication Sig Dispense Refill   • anastrozole (ARIMIDEX) 1 MG Tab Take 1 Tab by mouth every day. 30 Tab 3   • acetaminophen (TYLENOL) 325 MG Tab Take 650 mg by mouth every four hours as needed.       • Multiple Vitamins-Minerals (OCUVITE-LUTEIN) Tab Take 1 tablet by mouth every day.       • multivitamin (THERAGRAN) Tab Take 1 Tab by mouth every day.       • vitamin D (CHOLECALCIFEROL) 1000 UNIT Tab Take 1,000 Units by mouth every day.       • Omega-3 Fatty Acids (FISH OIL) 1000 MG Cap capsule Take 1,000 mg by mouth 3 times a day, with meals.       • Lidocaine-Prilocaine (EMLA EX) by Apply externally route.       • pantoprazole (PROTONIX) 40 MG Tablet Delayed Response Take 40 mg by mouth every day.       • Naproxen Sodium (ALEVE PO) Take  by mouth.       • fluticasone (FLONASE) 50 MCG/ACT nasal spray         • ondansetron (ZOFRAN ODT) 4 MG TABLET DISPERSIBLE Take 4 mg by mouth every 6 hours as needed for Nausea.       • prochlorperazine (COMPAZINE) 10 MG Tab Take 10 mg by mouth every 6 hours as needed.       • dexamethasone (DECADRON) 4 MG Tab Take 4 mg by mouth 2 times a day.          No current facility-administered medications for this visit.                Social History:     Social History   Social History            Social History   • Marital status:        Spouse name: N/A   • Number of children: N/A   • Years of education: N/A          Occupational History   • Not on file.             Social History Main Topics   • Smoking status: Former Smoker   • Smokeless tobacco: Former User         Comment: quit 18 years  "ago   • Alcohol use No   • Drug use: No   • Sexual activity: Not on file           Other Topics Concern   • Not on file          Social History Narrative   • No narrative on file            Family History:      Family History         Family History   Problem Relation Age of Onset   • Allergies Mother     • Arrythmia Mother     • Hyperlipidemia Mother     • Stroke Mother     • Cancer Mother           breast cancer   • Diabetes Brother     • Prostate cancer Brother              Review of Systems:  All other review of systems are negative except what was mentioned above in the HPI.     Constitutional: Negative for fever, chills, weight loss and malaise/fatigue.    HEENT: No new auditory or visual complaints. No sore throat and neck pain.     Respiratory: Negative for cough, sputum production, shortness of breath and wheezing.    Cardiovascular: Negative for chest pain, palpitations, orthopnea and leg swelling.    Gastrointestinal: Negative for heartburn, nausea, vomiting and abdominal pain.    Genitourinary: Negative for dysuria, hematuria    Musculoskeletal: No new arthralgias or myalgias   Skin: Negative for rash and itching.    Neurological: Negative for focal weakness and headaches.    Endo/Heme/Allergies: No abnormal bleed/bruise.    Psychiatric/Behavioral: No new depression/anxiety.     Physical Exam:  Vitals: /62 (BP Location: Right arm, Patient Position: Sitting)   Pulse 89   Temp 37.5 °C (99.5 °F) (Temporal)   Resp 16   Ht 1.626 m (5' 4\")   Wt 73 kg (160 lb 13.2 oz)   SpO2 96%   BMI 27.61 kg/m²      General: Not in acute distress, alert and oriented x 3  HEENT: No pallor, icterus. Oropharynx clear.   Neck: Supple, no palpable masses.  Port-A-Cath site looks okay  Lymph nodes: No palpable cervical, supraclavicular, axillary or inguinal lymphadenopathy.    CVS: regular rate and rhythm, no rubs or gallops  RESP: Clear to auscultate bilaterally, no wheezing or crackles.   ABD: Soft, non tender, non " distended, positive bowel sounds, no palpable organomegaly  EXT: No edema or cyanosis  CNS: Alert and oriented x3, No focal deficits.  Skin- No rash        Labs:           No visits with results within 1 Week(s) from this visit.   Latest known visit with results is:   Hospital Outpatient Visit on 12/12/2018   Component Date Value Ref Range Status   • WBC 12/12/2018 7.4  4.8 - 10.8 K/uL Final   • RBC 12/12/2018 3.92* 4.20 - 5.40 M/uL Final   • Hemoglobin 12/12/2018 12.0  12.0 - 16.0 g/dL Final   • Hematocrit 12/12/2018 37.6  37.0 - 47.0 % Final   • MCV 12/12/2018 95.9  81.4 - 97.8 fL Final   • MCH 12/12/2018 30.6  27.0 - 33.0 pg Final   • MCHC 12/12/2018 31.9* 33.6 - 35.0 g/dL Final   • RDW 12/12/2018 47.7  35.9 - 50.0 fL Final   • Platelet Count 12/12/2018 248  164 - 446 K/uL Final   • MPV 12/12/2018 8.9* 9.0 - 12.9 fL Final   • Neutrophils-Polys 12/12/2018 76.30* 44.00 - 72.00 % Final   • Lymphocytes 12/12/2018 13.80* 22.00 - 41.00 % Final   • Monocytes 12/12/2018 7.60  0.00 - 13.40 % Final   • Eosinophils 12/12/2018 1.50  0.00 - 6.90 % Final   • Basophils 12/12/2018 0.50  0.00 - 1.80 % Final   • Immature Granulocytes 12/12/2018 0.30  0.00 - 0.90 % Final   • Nucleated RBC 12/12/2018 0.00  /100 WBC Final   • Neutrophils (Absolute) 12/12/2018 5.66  2.00 - 7.15 K/uL Final     Includes immature neutrophils, if present.   • Lymphs (Absolute) 12/12/2018 1.02  1.00 - 4.80 K/uL Final   • Monos (Absolute) 12/12/2018 0.56  0.00 - 0.85 K/uL Final   • Eos (Absolute) 12/12/2018 0.11  0.00 - 0.51 K/uL Final   • Baso (Absolute) 12/12/2018 0.04  0.00 - 0.12 K/uL Final   • Immature Granulocytes (abs) 12/12/2018 0.02  0.00 - 0.11 K/uL Final   • NRBC (Absolute) 12/12/2018 0.00  K/uL Final   • Sodium 12/12/2018 140  135 - 145 mmol/L Final   • Potassium 12/12/2018 4.0  3.6 - 5.5 mmol/L Final   • Chloride 12/12/2018 108  96 - 112 mmol/L Final   • Co2 12/12/2018 25  20 - 33 mmol/L Final   • Anion Gap 12/12/2018 7.0  0.0 - 11.9 Final   •  Glucose 12/12/2018 115* 65 - 99 mg/dL Final   • Bun 12/12/2018 16  8 - 22 mg/dL Final   • Creatinine 12/12/2018 0.69  0.50 - 1.40 mg/dL Final   • Calcium 12/12/2018 9.5  8.5 - 10.5 mg/dL Final   • AST(SGOT) 12/12/2018 13  12 - 45 U/L Final   • ALT(SGPT) 12/12/2018 15  2 - 50 U/L Final   • Alkaline Phosphatase 12/12/2018 62  30 - 99 U/L Final   • Total Bilirubin 12/12/2018 0.4  0.1 - 1.5 mg/dL Final   • Albumin 12/12/2018 4.1  3.2 - 4.9 g/dL Final   • Total Protein 12/12/2018 6.8  6.0 - 8.2 g/dL Final   • Globulin 12/12/2018 2.7  1.9 - 3.5 g/dL Final   • A-G Ratio 12/12/2018 1.5  g/dL Final   • GFR If  12/12/2018 >60  >60 mL/min/1.73 m 2 Final   • GFR If Non  12/12/2018 >60  >60 mL/min/1.73 m 2 Final                  Assessment and Plan:     T2N1a ER 38% CT 1.1% and HER-2/maricarmen 3+ upper outer quadrant of left reconstructed breast . previous history of DCIS with L mastectomy and reconstruction in 2014. She had recurrence in the reconstructed breast tissue and is status post segmentectomy with negative margins.  Finished adjuvant [facility] in [place].  Switched to maintenance Herceptin after moving to [place].  Finished adjuvant radiation here and started Arimidex.  So for good tolerance.     She is due for a DEXA scan in April and will keep an eye on it.  I will schedule an echocardiogram in March for cardiac surveillance.  Continue Herceptin every 3 weeks and return to clinic in 9 weeks.  She agreed and verbalized  agreement and understanding with the current plan.  I answered all questions and concerns at this time            Please note that this dictation was created using voice recognition software. I have made every reasonable attempt to correct obvious errors, but I expect that there are errors of grammar and possibly content that I did not discover before finalizing the note.        SIGNATURES:  Cam Harper     CC:  Jose Cruz P.A.-C.  No ref. provider found

## 2019-09-30 DIAGNOSIS — Z17.0 MALIGNANT NEOPLASM OF NIPPLE OF LEFT BREAST IN FEMALE, ESTROGEN RECEPTOR POSITIVE (HCC): ICD-10-CM

## 2019-09-30 DIAGNOSIS — C50.012 MALIGNANT NEOPLASM OF NIPPLE OF LEFT BREAST IN FEMALE, ESTROGEN RECEPTOR POSITIVE (HCC): ICD-10-CM

## 2019-09-30 RX ORDER — ANASTROZOLE 1 MG/1
TABLET ORAL
OUTPATIENT
Start: 2019-09-30

## 2019-09-30 NOTE — TELEPHONE ENCOUNTER
Patient has establish care with a new medical oncologist at [facility].  All refills will need to go through her new provider.  Patient is aware to call for her refill to her new oncologist.

## 2019-09-30 NOTE — TELEPHONE ENCOUNTER
Was the patient seen in the last year in this department? Yes    Does patient have an active prescription for medications requested? No     Received Request Via: Pharmacy     Spoke to patient and she has transferred care to [facility] and established with a new provider there.     Patient stated she will call her new doctor there and ask them to send a refill in.